# Patient Record
Sex: FEMALE | Race: BLACK OR AFRICAN AMERICAN | NOT HISPANIC OR LATINO
[De-identification: names, ages, dates, MRNs, and addresses within clinical notes are randomized per-mention and may not be internally consistent; named-entity substitution may affect disease eponyms.]

---

## 2017-11-22 ENCOUNTER — APPOINTMENT (OUTPATIENT)
Dept: OBGYN | Facility: CLINIC | Age: 62
End: 2017-11-22
Payer: COMMERCIAL

## 2017-11-22 VITALS
WEIGHT: 180 LBS | BODY MASS INDEX: 29.99 KG/M2 | DIASTOLIC BLOOD PRESSURE: 78 MMHG | HEIGHT: 65 IN | SYSTOLIC BLOOD PRESSURE: 118 MMHG

## 2017-11-22 DIAGNOSIS — R92.2 INCONCLUSIVE MAMMOGRAM: ICD-10-CM

## 2017-11-22 PROCEDURE — 99396 PREV VISIT EST AGE 40-64: CPT

## 2017-11-27 LAB — HPV HIGH+LOW RISK DNA PNL CVX: NOT DETECTED

## 2017-12-02 LAB — CYTOLOGY CVX/VAG DOC THIN PREP: NORMAL

## 2018-11-30 ENCOUNTER — APPOINTMENT (OUTPATIENT)
Dept: OBGYN | Facility: CLINIC | Age: 63
End: 2018-11-30
Payer: COMMERCIAL

## 2018-11-30 VITALS
SYSTOLIC BLOOD PRESSURE: 118 MMHG | HEART RATE: 95 BPM | BODY MASS INDEX: 29.74 KG/M2 | WEIGHT: 178.5 LBS | HEIGHT: 65 IN | DIASTOLIC BLOOD PRESSURE: 75 MMHG

## 2018-11-30 DIAGNOSIS — Z01.419 ENCOUNTER FOR GYNECOLOGICAL EXAMINATION (GENERAL) (ROUTINE) W/OUT ABNORMAL FINDINGS: ICD-10-CM

## 2018-11-30 PROCEDURE — 99396 PREV VISIT EST AGE 40-64: CPT

## 2018-12-03 LAB — HPV HIGH+LOW RISK DNA PNL CVX: NOT DETECTED

## 2018-12-07 LAB — CYTOLOGY CVX/VAG DOC THIN PREP: NORMAL

## 2019-04-07 ENCOUNTER — EMERGENCY (EMERGENCY)
Facility: HOSPITAL | Age: 64
LOS: 1 days | Discharge: HOME | End: 2019-04-07
Attending: EMERGENCY MEDICINE
Payer: COMMERCIAL

## 2019-04-07 VITALS
OXYGEN SATURATION: 97 % | TEMPERATURE: 98 F | HEART RATE: 78 BPM | SYSTOLIC BLOOD PRESSURE: 127 MMHG | DIASTOLIC BLOOD PRESSURE: 73 MMHG | RESPIRATION RATE: 18 BRPM

## 2019-04-07 VITALS
RESPIRATION RATE: 20 BRPM | SYSTOLIC BLOOD PRESSURE: 145 MMHG | TEMPERATURE: 98 F | HEIGHT: 65 IN | HEART RATE: 84 BPM | DIASTOLIC BLOOD PRESSURE: 83 MMHG | WEIGHT: 175.05 LBS | OXYGEN SATURATION: 99 %

## 2019-04-07 LAB
ALBUMIN SERPL ELPH-MCNC: 4.4 G/DL — SIGNIFICANT CHANGE UP (ref 3.3–5)
ALP SERPL-CCNC: 54 U/L — SIGNIFICANT CHANGE UP (ref 40–120)
ALT FLD-CCNC: 13 U/L — SIGNIFICANT CHANGE UP (ref 10–45)
ANION GAP SERPL CALC-SCNC: 12 MMOL/L — SIGNIFICANT CHANGE UP (ref 5–17)
ANISOCYTOSIS BLD QL: SLIGHT — SIGNIFICANT CHANGE UP
APTT BLD: 30.6 SEC — SIGNIFICANT CHANGE UP (ref 27.5–36.3)
AST SERPL-CCNC: 18 U/L — SIGNIFICANT CHANGE UP (ref 10–40)
BASOPHILS # BLD AUTO: 0.1 K/UL — SIGNIFICANT CHANGE UP (ref 0–0.2)
BILIRUB SERPL-MCNC: 0.1 MG/DL — LOW (ref 0.2–1.2)
BUN SERPL-MCNC: 14 MG/DL — SIGNIFICANT CHANGE UP (ref 7–23)
CALCIUM SERPL-MCNC: 9.9 MG/DL — SIGNIFICANT CHANGE UP (ref 8.4–10.5)
CHLORIDE SERPL-SCNC: 102 MMOL/L — SIGNIFICANT CHANGE UP (ref 96–108)
CO2 SERPL-SCNC: 28 MMOL/L — SIGNIFICANT CHANGE UP (ref 22–31)
CREAT SERPL-MCNC: 0.73 MG/DL — SIGNIFICANT CHANGE UP (ref 0.5–1.3)
ELLIPTOCYTES BLD QL SMEAR: SLIGHT — SIGNIFICANT CHANGE UP
EOSINOPHIL # BLD AUTO: 0.2 K/UL — SIGNIFICANT CHANGE UP (ref 0–0.5)
GLUCOSE SERPL-MCNC: 115 MG/DL — HIGH (ref 70–99)
HCT VFR BLD CALC: 38.1 % — SIGNIFICANT CHANGE UP (ref 34.5–45)
HGB BLD-MCNC: 12.2 G/DL — SIGNIFICANT CHANGE UP (ref 11.5–15.5)
HYPOCHROMIA BLD QL: SLIGHT — SIGNIFICANT CHANGE UP
INR BLD: 1.18 RATIO — HIGH (ref 0.88–1.16)
LYMPHOCYTES # BLD AUTO: 35 % — SIGNIFICANT CHANGE UP (ref 13–44)
LYMPHOCYTES # BLD AUTO: 4.8 K/UL — HIGH (ref 1–3.3)
MCHC RBC-ENTMCNC: 23.1 PG — LOW (ref 27–34)
MCHC RBC-ENTMCNC: 31.9 GM/DL — LOW (ref 32–36)
MCV RBC AUTO: 72.4 FL — LOW (ref 80–100)
MICROCYTES BLD QL: SIGNIFICANT CHANGE UP
MONOCYTES # BLD AUTO: 0.8 K/UL — SIGNIFICANT CHANGE UP (ref 0–0.9)
MONOCYTES NFR BLD AUTO: 8 % — SIGNIFICANT CHANGE UP (ref 2–14)
NEUTROPHILS # BLD AUTO: 4 K/UL — SIGNIFICANT CHANGE UP (ref 1.8–7.4)
NEUTROPHILS NFR BLD AUTO: 43 % — SIGNIFICANT CHANGE UP (ref 43–77)
PLAT MORPH BLD: NORMAL — SIGNIFICANT CHANGE UP
PLATELET # BLD AUTO: 208 K/UL — SIGNIFICANT CHANGE UP (ref 150–400)
POIKILOCYTOSIS BLD QL AUTO: SLIGHT — SIGNIFICANT CHANGE UP
POTASSIUM SERPL-MCNC: 3.9 MMOL/L — SIGNIFICANT CHANGE UP (ref 3.5–5.3)
POTASSIUM SERPL-SCNC: 3.9 MMOL/L — SIGNIFICANT CHANGE UP (ref 3.5–5.3)
PROT SERPL-MCNC: 6.9 G/DL — SIGNIFICANT CHANGE UP (ref 6–8.3)
PROTHROM AB SERPL-ACNC: 13.5 SEC — HIGH (ref 10–12.9)
RBC # BLD: 5.27 M/UL — HIGH (ref 3.8–5.2)
RBC # FLD: 12.4 % — SIGNIFICANT CHANGE UP (ref 10.3–14.5)
RBC BLD AUTO: ABNORMAL
SODIUM SERPL-SCNC: 142 MMOL/L — SIGNIFICANT CHANGE UP (ref 135–145)
TROPONIN T, HIGH SENSITIVITY RESULT: <6 NG/L — SIGNIFICANT CHANGE UP (ref 0–51)
VARIANT LYMPHS # BLD: 14 % — HIGH (ref 0–6)
WBC # BLD: 9.9 K/UL — SIGNIFICANT CHANGE UP (ref 3.8–10.5)
WBC # FLD AUTO: 9.9 K/UL — SIGNIFICANT CHANGE UP (ref 3.8–10.5)

## 2019-04-07 PROCEDURE — 70450 CT HEAD/BRAIN W/O DYE: CPT | Mod: 26

## 2019-04-07 PROCEDURE — 80053 COMPREHEN METABOLIC PANEL: CPT

## 2019-04-07 PROCEDURE — 96375 TX/PRO/DX INJ NEW DRUG ADDON: CPT

## 2019-04-07 PROCEDURE — 99285 EMERGENCY DEPT VISIT HI MDM: CPT | Mod: 25

## 2019-04-07 PROCEDURE — 85610 PROTHROMBIN TIME: CPT

## 2019-04-07 PROCEDURE — 96361 HYDRATE IV INFUSION ADD-ON: CPT

## 2019-04-07 PROCEDURE — 93010 ELECTROCARDIOGRAM REPORT: CPT | Mod: NC

## 2019-04-07 PROCEDURE — 71046 X-RAY EXAM CHEST 2 VIEWS: CPT

## 2019-04-07 PROCEDURE — 84484 ASSAY OF TROPONIN QUANT: CPT

## 2019-04-07 PROCEDURE — 99284 EMERGENCY DEPT VISIT MOD MDM: CPT | Mod: 25

## 2019-04-07 PROCEDURE — 85730 THROMBOPLASTIN TIME PARTIAL: CPT

## 2019-04-07 PROCEDURE — 96374 THER/PROPH/DIAG INJ IV PUSH: CPT

## 2019-04-07 PROCEDURE — 93005 ELECTROCARDIOGRAM TRACING: CPT

## 2019-04-07 PROCEDURE — 70450 CT HEAD/BRAIN W/O DYE: CPT

## 2019-04-07 PROCEDURE — 71046 X-RAY EXAM CHEST 2 VIEWS: CPT | Mod: 26

## 2019-04-07 PROCEDURE — 85027 COMPLETE CBC AUTOMATED: CPT

## 2019-04-07 RX ORDER — MECLIZINE HCL 12.5 MG
0 TABLET ORAL
Qty: 0 | Refills: 0 | COMMUNITY

## 2019-04-07 RX ORDER — METFORMIN HYDROCHLORIDE 850 MG/1
0 TABLET ORAL
Qty: 0 | Refills: 0 | COMMUNITY

## 2019-04-07 RX ORDER — METOCLOPRAMIDE HCL 10 MG
10 TABLET ORAL ONCE
Qty: 0 | Refills: 0 | Status: COMPLETED | OUTPATIENT
Start: 2019-04-07 | End: 2019-04-07

## 2019-04-07 RX ORDER — KETOROLAC TROMETHAMINE 30 MG/ML
15 SYRINGE (ML) INJECTION ONCE
Qty: 0 | Refills: 0 | Status: DISCONTINUED | OUTPATIENT
Start: 2019-04-07 | End: 2019-04-07

## 2019-04-07 RX ORDER — ACETAMINOPHEN 500 MG
975 TABLET ORAL ONCE
Qty: 0 | Refills: 0 | Status: COMPLETED | OUTPATIENT
Start: 2019-04-07 | End: 2019-04-07

## 2019-04-07 RX ORDER — RISPERIDONE 4 MG/1
0 TABLET ORAL
Qty: 0 | Refills: 0 | COMMUNITY

## 2019-04-07 RX ORDER — SODIUM CHLORIDE 9 MG/ML
1000 INJECTION INTRAMUSCULAR; INTRAVENOUS; SUBCUTANEOUS ONCE
Qty: 0 | Refills: 0 | Status: COMPLETED | OUTPATIENT
Start: 2019-04-07 | End: 2019-04-07

## 2019-04-07 RX ADMIN — Medication 975 MILLIGRAM(S): at 20:28

## 2019-04-07 RX ADMIN — Medication 15 MILLIGRAM(S): at 22:17

## 2019-04-07 RX ADMIN — Medication 10 MILLIGRAM(S): at 20:28

## 2019-04-07 RX ADMIN — Medication 975 MILLIGRAM(S): at 22:00

## 2019-04-07 RX ADMIN — SODIUM CHLORIDE 1000 MILLILITER(S): 9 INJECTION INTRAMUSCULAR; INTRAVENOUS; SUBCUTANEOUS at 20:28

## 2019-04-07 RX ADMIN — Medication 15 MILLIGRAM(S): at 22:03

## 2019-04-07 RX ADMIN — SODIUM CHLORIDE 1000 MILLILITER(S): 9 INJECTION INTRAMUSCULAR; INTRAVENOUS; SUBCUTANEOUS at 22:00

## 2019-04-07 NOTE — ED ADULT NURSE NOTE - NEURO WDL
Alert and oriented to person, place and time, memory intact, behavior appropriate to situation, PERRL. + dizziness

## 2019-04-07 NOTE — ED PROVIDER NOTE - NSFOLLOWUPCLINICS_GEN_ALL_ED_FT
[facility]  Cardiology  [address] [place] [address]  Phone: [phone]  Fax:     [facility]  Neurology  [address] [address]  Phone: [phone]  Fax:   Follow Up Time:

## 2019-04-07 NOTE — ED PROVIDER NOTE - PROGRESS NOTE DETAILS
Reynaldo PGY2: Pt assessed at beside. Pt resting comfortably, pain controlled, pt questions answered. Vital signs stable. Feels well after meds, asking to be dc'd, informed to fu/ with pmd/cardio for stress and neuro for prior lacunar infarct on cth understands plan will seek fu. Will d/c with PMD f/u, strict return precautions given with read back per pt/family/caregiver.

## 2019-04-07 NOTE — ED PROVIDER NOTE - OBJECTIVE STATEMENT
64F hx of DM prior HA/vertigo however not in long time presents with a  cc of chest palpitations KELLY SOB L sided migraine HA and vertigo, sx for last x48 hours not going away despite meclizine, APAP got concerned for palpitations and SOB prompting ED visit unsure if HA or dizziness c/w prior hasn't had for years no head trauma LOC, no prior cva, no AC. Denies numbness, tingling or loss of sensation. Denies loss of motor function. No prior DVT/PE. Denies n/v/f/c/. Denies syncope, Denies chest palpitations, abdominal pain. Denies dysuria, hematuria, hematochezia, BRBPR, tarry stools, diarrhea, constipation.

## 2019-04-07 NOTE — ED PROVIDER NOTE - PHYSICAL EXAMINATION
GEN APPEARANCE: WDWN, alert and cooperative, non-toxic appearing and in NAD  HEAD: Atraumatic, normocephalic   EYES: PERRLa, EOMI, vision grossly intact.   EARS: Gross hearing intact.   NOSE: No nasal discharge, no external evidence of epistaxis.   NECK: Supple  CV: RRR, S1S2, no c/r/m/g. No cyanosis or pallor. Extremities warm, well perfused. Cap refill <2 seconds. No bruits.   LUNGS: CTAB. No wheezing. No rales. No rhonchi. No diminished breath sounds.   ABDOMEN: Soft, NTND. No guarding or rebound. No masses.   MSK: Spine appears normal, no spine point tenderness. No CVA ttp. No joint erythema or tenderness. Normal muscular development. Pelvis stable.  EXTREMITIES: No peripheral edema. No obvious joint or bony deformity.  NEURO: Alert, follows commands. Weight bearing normal. Speech normal. Sensation and motor normal x4 extremities. CN2-12 normal, coordination normal, ambulating normally.  SKIN: Normal color for race, warm, dry and intact. No evidence of rash.  PSYCH: Normal mood and affect.

## 2019-04-07 NOTE — ED PROVIDER NOTE - CARE PLAN
Assessment and plan of treatment:	Thank you for visiting our Emergency Department, it has been a pleasure taking part in your healthcare.    Your discharge diagnosis is: headache/SOB   Please take all discharge medications as indicated below:  Take Motrin/Tylenol for pain as needed, please follow instructions on manufacturers label. If you have any questions please consult a pharmacist or your PMD.  Please follow up with your PMD within x48 hours.  Please follow up with your neurologist within x48 hours.  Please follow up with Cardiology within x48 hours.  A copy of resulted labs, imaging, and findings have been provided to you.   You have had a detailed discussion with your provider regarding your diagnosis, care management and discharge planning including, but not limited to: return precautions, follow up visits with existing or new providers, new prescriptions and/or medication changes, wound and/or splint/cast care or other care   aspects specific to your diagnosis and treatment. You have been given the opportunity to have your questions answered. At this time you have been deemed stable and fit for discharge.  Return precautions to the Emergency Department include but are not limited to: unrelenting nausea, vomiting, fever, chills, chest pain, shortness of breath, dizziness, chest or abdominal pain, worsening back pain, syncope, blood in urine or stool, headache that doesn't resolve, numbness or tingling, loss of sensation, loss of motor function, or any other concerning symptoms. Principal Discharge DX:	Palpitations  Assessment and plan of treatment:	Thank you for visiting our Emergency Department, it has been a pleasure taking part in your healthcare.    Your discharge diagnosis is: headache/SOB   Please take all discharge medications as indicated below:  Take Motrin/Tylenol for pain as needed, please follow instructions on manufacturers label. If you have any questions please consult a pharmacist or your PMD.  Please follow up with your PMD within x48 hours.  Please follow up with your neurologist within x48 hours.  Please follow up with Cardiology within x48 hours.  A copy of resulted labs, imaging, and findings have been provided to you.   You have had a detailed discussion with your provider regarding your diagnosis, care management and discharge planning including, but not limited to: return precautions, follow up visits with existing or new providers, new prescriptions and/or medication changes, wound and/or splint/cast care or other care   aspects specific to your diagnosis and treatment. You have been given the opportunity to have your questions answered. At this time you have been deemed stable and fit for discharge.  Return precautions to the Emergency Department include but are not limited to: unrelenting nausea, vomiting, fever, chills, chest pain, shortness of breath, dizziness, chest or abdominal pain, worsening back pain, syncope, blood in urine or stool, headache that doesn't resolve, numbness or tingling, loss of sensation, loss of motor function, or any other concerning symptoms.

## 2019-04-07 NOTE — ED PROVIDER NOTE - ATTENDING CONTRIBUTION TO CARE
Patient presenting complaining of two days of intermittent palpitations and fast breathing.  Also reporting having associated migranes/dizziness which she normally has.  PMD is a cardiologist, no known history of CAD.  Now reporting feeling well without palpitations or difficulty breathing.    A 14 point review of systems is negative except as in HPI or otherwise documented.    Exam:  General: Patient well appearing, vital signs within normal limits  HEENT: airway patent with moist mucous membranes  Cardiac: RRR S1/S2 with strong peripheral pulses  Respiratory: lungs clear without respiratory distress  GI: abdomen soft, non tender, non distended  Neuro: no gross neurologic deficits  Skin: warm, well perfused  Psych: normal mood and affect    Patient presenting with palpitations/tachypnea now resolved with unremarkable physical exam, atypical presentation for ACS, no PE risk factors with atypical story for same, no infectious symptoms - plan for screening workup and likely referral to PMD for further workup.

## 2019-04-07 NOTE — ED PROVIDER NOTE - PLAN OF CARE
Thank you for visiting our Emergency Department, it has been a pleasure taking part in your healthcare.    Your discharge diagnosis is: headache/SOB   Please take all discharge medications as indicated below:  Take Motrin/Tylenol for pain as needed, please follow instructions on manufacturers label. If you have any questions please consult a pharmacist or your PMD.  Please follow up with your PMD within x48 hours.  Please follow up with your neurologist within x48 hours.  Please follow up with Cardiology within x48 hours.  A copy of resulted labs, imaging, and findings have been provided to you.   You have had a detailed discussion with your provider regarding your diagnosis, care management and discharge planning including, but not limited to: return precautions, follow up visits with existing or new providers, new prescriptions and/or medication changes, wound and/or splint/cast care or other care   aspects specific to your diagnosis and treatment. You have been given the opportunity to have your questions answered. At this time you have been deemed stable and fit for discharge.  Return precautions to the Emergency Department include but are not limited to: unrelenting nausea, vomiting, fever, chills, chest pain, shortness of breath, dizziness, chest or abdominal pain, worsening back pain, syncope, blood in urine or stool, headache that doesn't resolve, numbness or tingling, loss of sensation, loss of motor function, or any other concerning symptoms.

## 2019-04-07 NOTE — ED ADULT NURSE NOTE - NSIMPLEMENTINTERV_GEN_ALL_ED
Implemented All Fall Risk Interventions:  [place] to call system. Call bell, personal items and telephone within reach. Instruct patient to call for assistance. Room bathroom lighting operational. Non-slip footwear when patient is off stretcher. Physically safe environment: no spills, clutter or unnecessary equipment. Stretcher in lowest position, wheels locked, appropriate side rails in place. Provide visual cue, wrist band, yellow gown, etc. Monitor gait and stability. Monitor for mental status changes and reorient to person, place, and time. Review medications for side effects contributing to fall risk. Reinforce activity limits and safety measures with patient and family.

## 2019-04-07 NOTE — ED PROVIDER NOTE - CLINICAL SUMMARY MEDICAL DECISION MAKING FREE TEXT BOX
Reynaldo PGY2: 64F hx of DM prior HA/vertigo however not in long time presents with a  cc of chest palpitations KELLY SOB L sided migraine HA and vertigo, sx for last x48 hours not going away despite meclizine, APAP got concerned for palpitations and SOB prompting ED visit unsure if HA or dizziness c/w prior hasn't had for years no head trauma LOC, no prior cva, no AC.  exam vss well appearing non toxic exam non focal low c/f acs cva however will get labs cardiacs cth reassess

## 2019-04-07 NOTE — ED ADULT NURSE NOTE - CHPI ED NUR SYMPTOMS NEG
no chest pain/no chills/no vomiting/no syncope/no congestion/no nausea/no diaphoresis/no fever/no back pain

## 2019-04-07 NOTE — ED ADULT NURSE NOTE - OBJECTIVE STATEMENT
pt c/o "dizziness that has not improved with taking meclizine this afternoon . Also some palpitations/sob/ h/a, no visual disturbances- all symptoms started yesterday"

## 2019-11-11 PROBLEM — E11.9 TYPE 2 DIABETES MELLITUS WITHOUT COMPLICATIONS: Chronic | Status: ACTIVE | Noted: 2019-04-07

## 2019-11-11 PROBLEM — R42 DIZZINESS AND GIDDINESS: Chronic | Status: ACTIVE | Noted: 2019-04-07

## 2019-12-12 ENCOUNTER — LABORATORY RESULT (OUTPATIENT)
Age: 64
End: 2019-12-12

## 2019-12-12 ENCOUNTER — APPOINTMENT (OUTPATIENT)
Dept: OBGYN | Facility: CLINIC | Age: 64
End: 2019-12-12
Payer: COMMERCIAL

## 2019-12-12 VITALS
BODY MASS INDEX: 30.82 KG/M2 | WEIGHT: 185 LBS | HEIGHT: 65 IN | DIASTOLIC BLOOD PRESSURE: 76 MMHG | SYSTOLIC BLOOD PRESSURE: 122 MMHG

## 2019-12-12 DIAGNOSIS — Z01.419 ENCOUNTER FOR GYNECOLOGICAL EXAMINATION (GENERAL) (ROUTINE) W/OUT ABNORMAL FINDINGS: ICD-10-CM

## 2019-12-12 PROCEDURE — 99396 PREV VISIT EST AGE 40-64: CPT

## 2019-12-12 NOTE — PHYSICAL EXAM
[Awake] : awake [Acute Distress] : no acute distress [Alert] : alert [Mass] : no breast mass [Nipple Discharge] : no nipple discharge [Soft] : soft [Axillary LAD] : no axillary lymphadenopathy [Tender] : non tender [Oriented x3] : oriented to person, place, and time [Distended] : not distended [Flat Affect] : affect not flat [Normal] : uterus [No Bleeding] : there was no active vaginal bleeding [Uterine Adnexae] : were not tender and not enlarged [Adnexa Tenderness] : were not tender [CTAB] : CTAB [Ovarian Mass (___ Cm)] : there were no adnexal masses [RRR, No Murmurs] : RRR, no murmurs

## 2020-12-21 PROBLEM — Z01.419 ENCOUNTER FOR GYNECOLOGICAL EXAMINATION WITH PAPANICOLAOU SMEAR OF CERVIX: Status: RESOLVED | Noted: 2019-12-12 | Resolved: 2020-12-21

## 2021-02-25 ENCOUNTER — APPOINTMENT (OUTPATIENT)
Dept: OBGYN | Facility: CLINIC | Age: 66
End: 2021-02-25

## 2021-02-26 ENCOUNTER — APPOINTMENT (OUTPATIENT)
Dept: OBGYN | Facility: CLINIC | Age: 66
End: 2021-02-26
Payer: COMMERCIAL

## 2021-02-26 VITALS
HEIGHT: 65 IN | WEIGHT: 165 LBS | SYSTOLIC BLOOD PRESSURE: 130 MMHG | DIASTOLIC BLOOD PRESSURE: 82 MMHG | BODY MASS INDEX: 27.49 KG/M2

## 2021-02-26 VITALS — TEMPERATURE: 97.3 F

## 2021-02-26 DIAGNOSIS — Z01.419 ENCOUNTER FOR GYNECOLOGICAL EXAMINATION (GENERAL) (ROUTINE) W/OUT ABNORMAL FINDINGS: ICD-10-CM

## 2021-02-26 PROCEDURE — 99072 ADDL SUPL MATRL&STAF TM PHE: CPT

## 2021-02-26 PROCEDURE — 99396 PREV VISIT EST AGE 40-64: CPT

## 2021-02-26 NOTE — HISTORY OF PRESENT ILLNESS
[FreeTextEntry1] : 65 /o postmenopausal woman here for annual exam \par no complaints\par Pap 12/12/21 neg HPV neg \par Patient has felt well during COVId,and reluctant to get the vaccine, discussed

## 2021-02-26 NOTE — DISCUSSION/SUMMARY
[FreeTextEntry1] : 65 /o postmenopausal woman here for annual exam\par Pap with cotesting \par Screening Mammogram and Breast U/S for dens breasts\par BDS\par Patient had a colonoscopy in the past, thinks she is due soon\par

## 2021-03-04 LAB
CYTOLOGY CVX/VAG DOC THIN PREP: NORMAL
HPV HIGH+LOW RISK DNA PNL CVX: NOT DETECTED

## 2021-12-16 ENCOUNTER — EMERGENCY (EMERGENCY)
Facility: HOSPITAL | Age: 66
LOS: 1 days | Discharge: HOME | End: 2021-12-16
Attending: EMERGENCY MEDICINE
Payer: MEDICARE

## 2021-12-16 VITALS
TEMPERATURE: 98 F | SYSTOLIC BLOOD PRESSURE: 138 MMHG | RESPIRATION RATE: 20 BRPM | HEART RATE: 97 BPM | OXYGEN SATURATION: 100 % | DIASTOLIC BLOOD PRESSURE: 75 MMHG

## 2021-12-16 VITALS
DIASTOLIC BLOOD PRESSURE: 83 MMHG | HEIGHT: 65 IN | WEIGHT: 164.91 LBS | HEART RATE: 110 BPM | OXYGEN SATURATION: 98 % | RESPIRATION RATE: 16 BRPM | TEMPERATURE: 99 F | SYSTOLIC BLOOD PRESSURE: 144 MMHG

## 2021-12-16 LAB
ALBUMIN SERPL ELPH-MCNC: 3.8 G/DL — SIGNIFICANT CHANGE UP (ref 3.3–5)
ALP SERPL-CCNC: 66 U/L — SIGNIFICANT CHANGE UP (ref 40–120)
ALT FLD-CCNC: 13 U/L — SIGNIFICANT CHANGE UP (ref 10–45)
ANION GAP SERPL CALC-SCNC: 11 MMOL/L — SIGNIFICANT CHANGE UP (ref 5–17)
ANISOCYTOSIS BLD QL: SLIGHT — SIGNIFICANT CHANGE UP
APPEARANCE UR: CLEAR — SIGNIFICANT CHANGE UP
AST SERPL-CCNC: 26 U/L — SIGNIFICANT CHANGE UP (ref 10–40)
BASOPHILS # BLD AUTO: 0 K/UL — SIGNIFICANT CHANGE UP (ref 0–0.2)
BASOPHILS NFR BLD AUTO: 0 % — SIGNIFICANT CHANGE UP (ref 0–2)
BILIRUB SERPL-MCNC: 0.2 MG/DL — SIGNIFICANT CHANGE UP (ref 0.2–1.2)
BILIRUB UR-MCNC: NEGATIVE — SIGNIFICANT CHANGE UP
BUN SERPL-MCNC: 14 MG/DL — SIGNIFICANT CHANGE UP (ref 7–23)
CALCIUM SERPL-MCNC: 9.5 MG/DL — SIGNIFICANT CHANGE UP (ref 8.4–10.5)
CHLORIDE SERPL-SCNC: 102 MMOL/L — SIGNIFICANT CHANGE UP (ref 96–108)
CO2 SERPL-SCNC: 24 MMOL/L — SIGNIFICANT CHANGE UP (ref 22–31)
COLOR SPEC: SIGNIFICANT CHANGE UP
CREAT SERPL-MCNC: 0.66 MG/DL — SIGNIFICANT CHANGE UP (ref 0.5–1.3)
DIFF PNL FLD: NEGATIVE — SIGNIFICANT CHANGE UP
EOSINOPHIL # BLD AUTO: 0 K/UL — SIGNIFICANT CHANGE UP (ref 0–0.5)
EOSINOPHIL NFR BLD AUTO: 0 % — SIGNIFICANT CHANGE UP (ref 0–6)
GLUCOSE SERPL-MCNC: 184 MG/DL — HIGH (ref 70–99)
GLUCOSE UR QL: NEGATIVE — SIGNIFICANT CHANGE UP
HCT VFR BLD CALC: 35.2 % — SIGNIFICANT CHANGE UP (ref 34.5–45)
HGB BLD-MCNC: 10.9 G/DL — LOW (ref 11.5–15.5)
KETONES UR-MCNC: NEGATIVE — SIGNIFICANT CHANGE UP
LEUKOCYTE ESTERASE UR-ACNC: NEGATIVE — SIGNIFICANT CHANGE UP
LYMPHOCYTES # BLD AUTO: 5.05 K/UL — HIGH (ref 1–3.3)
LYMPHOCYTES # BLD AUTO: 65.8 % — HIGH (ref 13–44)
MANUAL SMEAR VERIFICATION: SIGNIFICANT CHANGE UP
MCHC RBC-ENTMCNC: 22.9 PG — LOW (ref 27–34)
MCHC RBC-ENTMCNC: 31 GM/DL — LOW (ref 32–36)
MCV RBC AUTO: 73.8 FL — LOW (ref 80–100)
MICROCYTES BLD QL: SLIGHT — SIGNIFICANT CHANGE UP
MONOCYTES # BLD AUTO: 0.27 K/UL — SIGNIFICANT CHANGE UP (ref 0–0.9)
MONOCYTES NFR BLD AUTO: 3.5 % — SIGNIFICANT CHANGE UP (ref 2–14)
NEUTROPHILS # BLD AUTO: 2.35 K/UL — SIGNIFICANT CHANGE UP (ref 1.8–7.4)
NEUTROPHILS NFR BLD AUTO: 30.7 % — LOW (ref 43–77)
NITRITE UR-MCNC: NEGATIVE — SIGNIFICANT CHANGE UP
PH UR: 6.5 — SIGNIFICANT CHANGE UP (ref 5–8)
PLAT MORPH BLD: NORMAL — SIGNIFICANT CHANGE UP
PLATELET # BLD AUTO: 230 K/UL — SIGNIFICANT CHANGE UP (ref 150–400)
POLYCHROMASIA BLD QL SMEAR: SLIGHT — SIGNIFICANT CHANGE UP
POTASSIUM SERPL-MCNC: 4.5 MMOL/L — SIGNIFICANT CHANGE UP (ref 3.5–5.3)
POTASSIUM SERPL-SCNC: 4.5 MMOL/L — SIGNIFICANT CHANGE UP (ref 3.5–5.3)
PROT SERPL-MCNC: 6.3 G/DL — SIGNIFICANT CHANGE UP (ref 6–8.3)
PROT UR-MCNC: NEGATIVE — SIGNIFICANT CHANGE UP
RBC # BLD: 4.77 M/UL — SIGNIFICANT CHANGE UP (ref 3.8–5.2)
RBC # FLD: 14.6 % — HIGH (ref 10.3–14.5)
RBC BLD AUTO: ABNORMAL
SODIUM SERPL-SCNC: 137 MMOL/L — SIGNIFICANT CHANGE UP (ref 135–145)
SP GR SPEC: 1.01 — SIGNIFICANT CHANGE UP (ref 1.01–1.02)
TARGETS BLD QL SMEAR: SLIGHT — SIGNIFICANT CHANGE UP
UROBILINOGEN FLD QL: NEGATIVE — SIGNIFICANT CHANGE UP
WBC # BLD: 7.67 K/UL — SIGNIFICANT CHANGE UP (ref 3.8–10.5)
WBC # FLD AUTO: 7.67 K/UL — SIGNIFICANT CHANGE UP (ref 3.8–10.5)

## 2021-12-16 PROCEDURE — 97161 PT EVAL LOW COMPLEX 20 MIN: CPT

## 2021-12-16 PROCEDURE — 99284 EMERGENCY DEPT VISIT MOD MDM: CPT

## 2021-12-16 PROCEDURE — 93005 ELECTROCARDIOGRAM TRACING: CPT

## 2021-12-16 PROCEDURE — 85025 COMPLETE CBC W/AUTO DIFF WBC: CPT

## 2021-12-16 PROCEDURE — 80053 COMPREHEN METABOLIC PANEL: CPT

## 2021-12-16 PROCEDURE — 36415 COLL VENOUS BLD VENIPUNCTURE: CPT

## 2021-12-16 PROCEDURE — 81003 URINALYSIS AUTO W/O SCOPE: CPT

## 2021-12-16 RX ORDER — LIDOCAINE 4 G/100G
1 CREAM TOPICAL ONCE
Refills: 0 | Status: COMPLETED | OUTPATIENT
Start: 2021-12-16 | End: 2021-12-16

## 2021-12-16 RX ORDER — DIAZEPAM 5 MG
5 TABLET ORAL ONCE
Refills: 0 | Status: DISCONTINUED | OUTPATIENT
Start: 2021-12-16 | End: 2021-12-16

## 2021-12-16 RX ORDER — SODIUM CHLORIDE 9 MG/ML
1000 INJECTION INTRAMUSCULAR; INTRAVENOUS; SUBCUTANEOUS ONCE
Refills: 0 | Status: COMPLETED | OUTPATIENT
Start: 2021-12-16 | End: 2021-12-16

## 2021-12-16 RX ORDER — OXYCODONE HYDROCHLORIDE 5 MG/1
5 TABLET ORAL ONCE
Refills: 0 | Status: DISCONTINUED | OUTPATIENT
Start: 2021-12-16 | End: 2021-12-16

## 2021-12-16 RX ORDER — ACETAMINOPHEN 500 MG
650 TABLET ORAL ONCE
Refills: 0 | Status: COMPLETED | OUTPATIENT
Start: 2021-12-16 | End: 2021-12-16

## 2021-12-16 RX ADMIN — Medication 5 MILLIGRAM(S): at 12:39

## 2021-12-16 RX ADMIN — LIDOCAINE 1 PATCH: 4 CREAM TOPICAL at 12:39

## 2021-12-16 RX ADMIN — OXYCODONE HYDROCHLORIDE 5 MILLIGRAM(S): 5 TABLET ORAL at 15:39

## 2021-12-16 RX ADMIN — Medication 650 MILLIGRAM(S): at 12:40

## 2021-12-16 RX ADMIN — SODIUM CHLORIDE 1000 MILLILITER(S): 9 INJECTION INTRAMUSCULAR; INTRAVENOUS; SUBCUTANEOUS at 18:52

## 2021-12-16 NOTE — ED ADULT NURSE NOTE - OBJECTIVE STATEMENT
65 yo presents to the ED from home. A&Ox4, ambulatory c/o leg pain radiating from back x months. only hurts in the AM or when ambulating. no trauma. no urinary symptoms. able to ambulate but with pain. Pt states "My pain management doctor sent me to the neurologist and the neurologist told me to come to the ER . I have had x-rays and MRI a few months ago and I was told I have arthritis. My pain is severe in the morning in my back and the front of my legs and it improves as the day goes on, however I have severe pain when I walk and I either need to use my cane or my  to assist me". Pt denies numbness tingling weakness at this time.  Denies loss of bowel or bladder function. Denies fever and chills. Denies chest pains and shortness of breath.

## 2021-12-16 NOTE — CHART NOTE - NSCHARTNOTEFT_GEN_A_CORE
ER covering . PT recs: outpatient PT, DME: RW, Shower chair (3;1 [employer]) and transport W/C   Met with [facility]RIST[facility] De Leon requested scripts for DME and LMN , pt was provided with a RW by ED. Patient made aware that CM will order DME if the plan is for DC today however DME will not be approved and delivered tonight as they are closed. As per ACP unclear at this time if pt will be DC due to elevated HR. CM contact number provided to CHRISTIAN De Leon in case pt is DC tonight and made aware that CM will need this by 7:30.

## 2021-12-16 NOTE — ED PROVIDER NOTE - ATTENDING CONTRIBUTION TO CARE
65 y/o f with pmhx presents for b/l leg pain over the lat few months, worsening in the last week. pain worse with ambulation and is using her  to assist her with ambulation. no incontinence of bowel or bladder. no saddle anesthesia. no urinary retentio. no weakness or numbness. had outpt XR and MRI which states were normal  Gen.  at rest appears comfortable, but noted discomfort when ambulating and holding on to her   HEENT:  perrl eomi  Lungs:  b/l bs  CVS: S1S2   Abd;  soft non tend no distention  Ext: no pitting edema no erythema  Neuro: aaox3  MSK: strength 5/5 b/l upper and lower ext.

## 2021-12-16 NOTE — PHYSICAL THERAPY INITIAL EVALUATION ADULT - PERTINENT HX OF CURRENT PROBLEM, REHAB EVAL
presents to the ER for evaluation of bilateral leg pain for the past several months, worsening in the last week. Pt under the care of pain mgmt. Pt awake, alert oriented x3 states "My pain management doctor sent me to the neurologist and the neurologist told me to come to the ER .  I have had x-rays and MRI a few months ago and I was told I have arthritis.

## 2021-12-16 NOTE — ED PROVIDER NOTE - PATIENT PORTAL LINK FT
You can access the FollowMyHealth Patient Portal offered by [facility] by registering at the following website: [email or web address]. By joining [facility] FollowMyHealth portal, you will also be able to view your health information using other applications (apps) compatible with our system.

## 2021-12-16 NOTE — ED PROVIDER NOTE - NSFOLLOWUPINSTRUCTIONS_ED_ALL_ED_FT
1. Follow up with your primary care physician within 2-3days for reevaluation.  2.  Return to the Emergency Department for worsening, progressive or any other concerning symptoms.   3. -- Please use 650mg Tylenol (also called acetaminophen) every 4 hours & 600mg Motrin (also called Advil or ibuprofen) every 6 hours as needed for pain/discomfort/swelling. You can get these without a prescription. Don't use more than 3500mg of Tylenol in any 24-hour period. Make sure your other prescription/over-the-counter medications don't contain any Tylenol so you don't take too much. If you have any stomach discomfort while taking Motrin, you can use TUMS or Pepcid or Zantac (these can all be bought without a prescription).   4. Follow up with your neurologist  and pain management   in the next week and orthopedist   5.  Use lidocaine patch to affected area for pain once daily

## 2021-12-16 NOTE — ED ADULT NURSE REASSESSMENT NOTE - NS ED NURSE REASSESS COMMENT FT1
Report received from FELICIA Golden. Pt AAOx4, NAD, resp nonlabored, skin warm/dry, resting comfortably in bed with family at bedside. Pt with no complaints at this time, assisted to bathroom with walker, steady gait noted. Pt denies headache, dizziness, chest pain, palpitations, SOB, abd pain, n/v/d, urinary symptoms, fevers, chills, weakness at this time. Pt awaiting dispo. Safety maintained.

## 2021-12-16 NOTE — ED PROVIDER NOTE - CLINICAL SUMMARY MEDICAL DECISION MAKING FREE TEXT BOX
pt with chronic bilat  leg pain with hx of lwoer back pain, no recent injury or truama,  had recent imaging, analgesia,   tachym, labs, ekg, ya,    o k to dc    f/y with ortho, neuor, pain pt with chronic bilat  leg pain with hx of lwoer back pain, no recent injury or truama,  had recent imaging, analgesia,   tachym, labs, ekg, ya,    o k to dc    f/y with ortho, neuor, pain  ATTG: : back pain with outpt imaging , will treat with pain medication, check labs, Physical therapy eval, and re eval for dispo.

## 2021-12-16 NOTE — PHYSICAL THERAPY INITIAL EVALUATION ADULT - ADDITIONAL COMMENTS
Pt. lives in apt. with , 3 steps to get in. ~10 steps inside. Patient ambulated without AD independent. Lately holding on to  while walking.  available to assist at all times. No h/o falls.

## 2021-12-16 NOTE — ED PROVIDER NOTE - CARE PROVIDER_API CALL
Ruddy Cordoba)  Orthopedics  [address] [place] [address]  Phone: [phone]  Fax: [phone]  Follow Up Time: 7-10 Days

## 2021-12-16 NOTE — ED PROVIDER NOTE - OBJECTIVE STATEMENT
67 yo female in 31 L presents to the ER for evaluation of bilateral leg pain for the past several months, worsening in the last week. Pt under the care of pain mgmt. 65 yo female in 31 L presents to the ER for evaluation of bilateral leg pain for the past several months, worsening in the last week. Pt under the care of pain mgmt. Pt awake, alert oriented x3 states "My pain management doctor sent me to the neurologist and the neurologist told me to come to the ER .  I have had x-rays and MRI a few months ago and I was told I have arthritis.  My pain is severe in the morning in my back and the front of my legs and it improves as the day goes on, however I have severe pain when I walk and I either need to use my cane or my  to assist me". Pt denies numbness tingling weakness at this time.  DEnies loss of bowel or bladder function. Denies fever and chills.  Denies chest pains and shortness of breath.

## 2021-12-16 NOTE — PHYSICAL THERAPY INITIAL EVALUATION ADULT - LEVEL OF INDEPENDENCE: STAIR NEGOTIATION, REHAB EVAL
To be assessed as appropriate pt. in ER and steps not available. able to perform high stepping holding to walker with supervision.

## 2021-12-16 NOTE — ED PROVIDER NOTE - PROGRESS NOTE DETAILS
Alhaji Amaya NP-C Called pt's neurologist Carlos Deng 3x,  was placed on hold and never connected with staff.   Pt resting comfortably at this time endorsed to me by rosalba. re-examined. 2+ dp pulse, sensory intact bl le. no leg swelling. 5/5 ehl. no warmth. no obv cause of the tachy, improved / improving. pain quite uncomfortable mostly to bear weight and to walk. dw her need to f/u orthopedics. Alhaji Amaya NP-C - Pt's HR 98,  Pt feeling  better at this time.  UA neg,   labs normal, EKG sinus tach, hr dec 98.   Pt to dc home  f/u with  neuro and f/u pain mgmt.

## 2021-12-16 NOTE — PHYSICAL THERAPY INITIAL EVALUATION ADULT - PLANNED THERAPY INTERVENTIONS, PT EVAL
[facility]        Pt Name: Eli Beltran  MRN: [id number]  [place] 2010  Date of evaluation: 5/18/2019  Provider: Mervin Green PA-C  PCP: No primary care provider on file. This patient was not seen and evaluated by the attending physician No att. providers found. CHIEF COMPLAINT       Chief Complaint   Patient presents with    Cough     cough and congestion onset last night. denies fever       HISTORY OF PRESENT ILLNESS   (Location/Symptom, Timing/Onset, Context/Setting, Quality, Duration, Modifying Factors, Severity)  Note limiting factors. Eli Beltran is a 10 y.o. male . Presents for evaluation of cough and congestion that began yesterday. Patient has also been complaining of chest pain with cough. No sore throat, difficulty swallowing or drooling. No rashes. No abdominal pain, vomiting or diarrhea. He is eating and drinking with good urine output. No known sick contacts with similar symptoms. Otherwise healthy with no other complaints or concerns at this time. Immunizations are up-to-date. Nursing Notes were all reviewed and agreed with or any disagreements were addressed  in the HPI. REVIEW OF SYSTEMS    (2-9 systems for level 4, 10 or more for level 5)     Review of Systems   Constitutional: Negative for activity change, appetite change, chills and fever. HENT: Positive for congestion, rhinorrhea and sneezing. Negative for trouble swallowing. Respiratory: Positive for cough. Negative for shortness of breath. Cardiovascular: Positive for chest pain. Gastrointestinal: Negative for abdominal pain, constipation, diarrhea and vomiting. Genitourinary: Negative for difficulty urinating, dysuria, enuresis, frequency, hematuria and urgency. Musculoskeletal: Negative for gait problem, neck pain and neck stiffness. Skin: Negative for rash. Neurological: Negative for headaches.        Positives and Pertinent negatives as per HPI. Except as noted abovein the ROS, all other systems were reviewed and negative. PAST MEDICAL HISTORY   History reviewed. No pertinent past medical history. SURGICAL HISTORY   History reviewed. No pertinent surgical history. CURRENTMEDICATIONS       Previous Medications    No medications on file         ALLERGIES     Patient has no known allergies. FAMILYHISTORY     History reviewed. No pertinent family history.        SOCIAL HISTORY       Social History     Socioeconomic History    Marital status: Single     Spouse name: None    Number of children: None    Years of education: None    Highest education level: None   Occupational History    None   Social Needs    Financial resource strain: None    Food insecurity:     Worry: None     Inability: None    Transportation needs:     Medical: None     Non-medical: None   Tobacco Use    Smoking status: Never Smoker    Smokeless tobacco: Never Used   Substance and Sexual Activity    Alcohol use: None    Drug use: None    Sexual activity: None   Lifestyle    Physical activity:     Days per week: None     Minutes per session: None    Stress: None   Relationships    Social connections:     Talks on phone: None     Gets together: None     Attends Bahai service: None     Active member of club or organization: None     Attends meetings of clubs or organizations: None     Relationship status: None    Intimate partner violence:     Fear of current or ex partner: None     Emotionally abused: None     Physically abused: None     Forced sexual activity: None   Other Topics Concern    None   Social History Narrative    None       SCREENINGS             PHYSICAL EXAM    (up to 7 for level 4, 8 or more for level 5)     ED Triage Vitals [05/18/19 1745]   BP Temp Temp Source Heart Rate Resp SpO2 Height Weight - Scale   -- 97.9 °F (36.6 °C) Infrared 111 18 100 % -- 63 lb (28.6 kg)       Physical Exam Constitutional: He appears well-developed and well-nourished. He is active. HENT:   Head: Atraumatic. Right Ear: Tympanic membrane normal.   Left Ear: Tympanic membrane normal.   Nose: Nasal discharge present. Mouth/Throat: Mucous membranes are moist. No tonsillar exudate. Pharynx is normal.   Eyes: Right eye exhibits no discharge. Left eye exhibits no discharge. Neck: Normal range of motion. Neck supple. Cardiovascular: Normal rate and regular rhythm. Pulmonary/Chest: Effort normal. No stridor. No respiratory distress. Air movement is not decreased. He has no wheezes. He has rhonchi. He exhibits no retraction. Abdominal: Full and soft. Bowel sounds are normal.   Musculoskeletal: Normal range of motion. He exhibits no deformity. Neurological: He is alert. Skin: Skin is warm and dry. He is not diaphoretic. Nursing note and vitals reviewed. DIAGNOSTIC RESULTS   LABS:    Labs Reviewed - No data to display    All other labs were within normal range or not returned as of this dictation. EKG: All EKG's are interpreted by the Emergency Department Physician who either signs orCo-signs this chart in the absence of a cardiologist.  Please see their note for interpretation of EKG. RADIOLOGY:   Non-plain film images such as CT, Ultrasound and MRI are read by the radiologist. Plain radiographic images are visualized andpreliminarily interpreted by the  ED Provider with the below findings:        Interpretation perthe Radiologist below, if available at the time of this note:    XR CHEST STANDARD (2 VW)   Final Result   No active cardiopulmonary disease           Xr Chest Standard (2 Vw)    Result Date: 5/18/2019  EXAMINATION: TWO XRAY VIEWS OF THE CHEST 5/18/2019 6:09 pm COMPARISON: None. HISTORY: ORDERING SYSTEM PROVIDED HISTORY: fever, cough TECHNOLOGIST PROVIDED HISTORY: Reason for exam:->fever, cough FINDINGS: Heart size and pulmonary vessels within normal limits. Lungs clear.  Costophrenic angles sharp     No active cardiopulmonary disease          PROCEDURES   Unless otherwise noted below, none     Procedures    CRITICAL CARE TIME   N/A    CONSULTS:  None      EMERGENCY DEPARTMENT COURSE and DIFFERENTIALDIAGNOSIS/MDM:   Vitals:    Vitals:    05/18/19 1745   Pulse: 111   Resp: 18   Temp: 97.9 °F (36.6 °C)   TempSrc: Infrared   SpO2: 100%   Weight: 63 lb (28.6 kg)       Patient was given thefollowing medications:  Medications - No data to display    Patient presents for evaluation of cough congestion, genital exam, he is well-appearing and in no acute distress. Vitals are stable and he is afebrile. He does have rhonchi noted to right lung base, cleared with cough. Good aeration with no wheezing. Chest is nontender and abdomen is benign. He has dried nasal drainage. HEENT exam is otherwise unremarkable. Chest x-ray shows no active cardio pulmonary disease. Patient was given prescriptions for Motrin and Claritin for symptomatic relief. I estimate there is LOW risk for PNEUMONIA, MENINGITIS, PERITONSILLAR ABSCESS, SEPSIS, MALIGNANT OTITIS EXTERNA, OR EPIGLOTTITIS thus I consider the discharge disposition reasonable. Based on clinical presentation, physical exam and diagnostics, the patient likely has a viral illness. I discussed symptomatic treatment, fluids, and rest. Patient is advised to follow-up with their family doctor within 24-48 hours and return to the ER if he does not improve as anticipated over the next several days, develops difficulty breathing, weakness, inability to take liquids, or has other concerns. FINAL IMPRESSION      1.  Viral URI with cough          DISPOSITION/PLAN   DISPOSITION Decision To Discharge 05/18/2019 06:44:18 PM      PATIENT REFERREDTO:  [facility] Emergency Department  [address]  [phone]  Go to   If symptoms worsen    [facility]) Pre-Services  [phone]          DISCHARGE MEDICATIONS:  New Prescriptions DESLORATADINE (CLARINEX) 0.5 MG/ML SYRUP    Take 5 mLs by mouth daily    IBUPROFEN (CHILDRENS ADVIL) 100 MG/5ML SUSPENSION    Take 14.3 mLs by mouth every 8 hours as needed for Fever       DISCONTINUED MEDICATIONS:  Discontinued Medications    No medications on file              (Please note that portions ofthis note were completed with a voice recognition program.  Efforts were made to edit the dictations but occasionally words are mis-transcribed.)    Wilfred Uribe PA-C (electronically signed)            [place], [place]  05/18/19 3817 GOAL: Pt will negotiate 5 steps  up and down using HR support, independent  within 2-3 weeks./balance training/gait training/strengthening

## 2021-12-23 ENCOUNTER — APPOINTMENT (OUTPATIENT)
Dept: ORTHOPEDIC SURGERY | Facility: CLINIC | Age: 66
End: 2021-12-23
Payer: MEDICARE

## 2021-12-23 VITALS
BODY MASS INDEX: 27.49 KG/M2 | SYSTOLIC BLOOD PRESSURE: 132 MMHG | HEIGHT: 65 IN | DIASTOLIC BLOOD PRESSURE: 81 MMHG | HEART RATE: 96 BPM | WEIGHT: 165 LBS

## 2021-12-23 PROCEDURE — 99204 OFFICE O/P NEW MOD 45 MIN: CPT

## 2022-01-03 ENCOUNTER — EMERGENCY (EMERGENCY)
Facility: HOSPITAL | Age: 67
LOS: 1 days | Discharge: HOME | End: 2022-01-03
Attending: EMERGENCY MEDICINE
Payer: MEDICARE

## 2022-01-03 VITALS
TEMPERATURE: 98 F | RESPIRATION RATE: 18 BRPM | DIASTOLIC BLOOD PRESSURE: 85 MMHG | SYSTOLIC BLOOD PRESSURE: 127 MMHG | OXYGEN SATURATION: 97 % | HEART RATE: 123 BPM

## 2022-01-03 VITALS
TEMPERATURE: 98 F | WEIGHT: 164.91 LBS | RESPIRATION RATE: 16 BRPM | HEIGHT: 65 IN | DIASTOLIC BLOOD PRESSURE: 66 MMHG | HEART RATE: 97 BPM | OXYGEN SATURATION: 100 % | SYSTOLIC BLOOD PRESSURE: 146 MMHG

## 2022-01-03 LAB
ALBUMIN SERPL ELPH-MCNC: 4.2 G/DL — SIGNIFICANT CHANGE UP (ref 3.3–5)
ALP SERPL-CCNC: 54 U/L — SIGNIFICANT CHANGE UP (ref 40–120)
ALT FLD-CCNC: 11 U/L — SIGNIFICANT CHANGE UP (ref 10–45)
ANION GAP SERPL CALC-SCNC: 13 MMOL/L — SIGNIFICANT CHANGE UP (ref 5–17)
APPEARANCE UR: CLEAR — SIGNIFICANT CHANGE UP
AST SERPL-CCNC: 42 U/L — HIGH (ref 10–40)
BASOPHILS # BLD AUTO: 0.08 K/UL — SIGNIFICANT CHANGE UP (ref 0–0.2)
BASOPHILS NFR BLD AUTO: 0.9 % — SIGNIFICANT CHANGE UP (ref 0–2)
BILIRUB SERPL-MCNC: 0.4 MG/DL — SIGNIFICANT CHANGE UP (ref 0.2–1.2)
BILIRUB UR-MCNC: NEGATIVE — SIGNIFICANT CHANGE UP
BUN SERPL-MCNC: 9 MG/DL — SIGNIFICANT CHANGE UP (ref 7–23)
CALCIUM SERPL-MCNC: 9.6 MG/DL — SIGNIFICANT CHANGE UP (ref 8.4–10.5)
CHLORIDE SERPL-SCNC: 98 MMOL/L — SIGNIFICANT CHANGE UP (ref 96–108)
CO2 SERPL-SCNC: 23 MMOL/L — SIGNIFICANT CHANGE UP (ref 22–31)
COLOR SPEC: SIGNIFICANT CHANGE UP
CREAT SERPL-MCNC: 0.56 MG/DL — SIGNIFICANT CHANGE UP (ref 0.5–1.3)
CRP SERPL-MCNC: <3 MG/L — SIGNIFICANT CHANGE UP (ref 0–4)
DIFF PNL FLD: NEGATIVE — SIGNIFICANT CHANGE UP
ELLIPTOCYTES BLD QL SMEAR: SLIGHT — SIGNIFICANT CHANGE UP
EOSINOPHIL # BLD AUTO: 0 K/UL — SIGNIFICANT CHANGE UP (ref 0–0.5)
EOSINOPHIL NFR BLD AUTO: 0 % — SIGNIFICANT CHANGE UP (ref 0–6)
ERYTHROCYTE [SEDIMENTATION RATE] IN BLOOD: 45 MM/HR — HIGH (ref 0–20)
GIANT PLATELETS BLD QL SMEAR: PRESENT — SIGNIFICANT CHANGE UP
GLUCOSE SERPL-MCNC: 159 MG/DL — HIGH (ref 70–99)
GLUCOSE UR QL: NEGATIVE — SIGNIFICANT CHANGE UP
HCT VFR BLD CALC: 38.9 % — SIGNIFICANT CHANGE UP (ref 34.5–45)
HGB BLD-MCNC: 12 G/DL — SIGNIFICANT CHANGE UP (ref 11.5–15.5)
HYPOCHROMIA BLD QL: SLIGHT — SIGNIFICANT CHANGE UP
KETONES UR-MCNC: NEGATIVE — SIGNIFICANT CHANGE UP
LEUKOCYTE ESTERASE UR-ACNC: NEGATIVE — SIGNIFICANT CHANGE UP
LYMPHOCYTES # BLD AUTO: 2.78 K/UL — SIGNIFICANT CHANGE UP (ref 1–3.3)
LYMPHOCYTES # BLD AUTO: 31.6 % — SIGNIFICANT CHANGE UP (ref 13–44)
MANUAL SMEAR VERIFICATION: SIGNIFICANT CHANGE UP
MCHC RBC-ENTMCNC: 22.6 PG — LOW (ref 27–34)
MCHC RBC-ENTMCNC: 30.8 GM/DL — LOW (ref 32–36)
MCV RBC AUTO: 73.4 FL — LOW (ref 80–100)
MONOCYTES # BLD AUTO: 0.46 K/UL — SIGNIFICANT CHANGE UP (ref 0–0.9)
MONOCYTES NFR BLD AUTO: 5.2 % — SIGNIFICANT CHANGE UP (ref 2–14)
NEUTROPHILS # BLD AUTO: 5.48 K/UL — SIGNIFICANT CHANGE UP (ref 1.8–7.4)
NEUTROPHILS NFR BLD AUTO: 62.3 % — SIGNIFICANT CHANGE UP (ref 43–77)
NITRITE UR-MCNC: NEGATIVE — SIGNIFICANT CHANGE UP
PH UR: 6.5 — SIGNIFICANT CHANGE UP (ref 5–8)
PLAT MORPH BLD: NORMAL — SIGNIFICANT CHANGE UP
PLATELET # BLD AUTO: 303 K/UL — SIGNIFICANT CHANGE UP (ref 150–400)
POIKILOCYTOSIS BLD QL AUTO: SLIGHT — SIGNIFICANT CHANGE UP
POLYCHROMASIA BLD QL SMEAR: SLIGHT — SIGNIFICANT CHANGE UP
POTASSIUM SERPL-MCNC: 5.7 MMOL/L — HIGH (ref 3.5–5.3)
POTASSIUM SERPL-SCNC: 5.7 MMOL/L — HIGH (ref 3.5–5.3)
PROT SERPL-MCNC: 7 G/DL — SIGNIFICANT CHANGE UP (ref 6–8.3)
PROT UR-MCNC: SIGNIFICANT CHANGE UP
RBC # BLD: 5.3 M/UL — HIGH (ref 3.8–5.2)
RBC # FLD: 14 % — SIGNIFICANT CHANGE UP (ref 10.3–14.5)
RBC BLD AUTO: ABNORMAL
SARS-COV-2 RNA SPEC QL NAA+PROBE: SIGNIFICANT CHANGE UP
SODIUM SERPL-SCNC: 134 MMOL/L — LOW (ref 135–145)
SP GR SPEC: 1.01 — SIGNIFICANT CHANGE UP (ref 1.01–1.02)
T PALLIDUM AB TITR SER: NEGATIVE — SIGNIFICANT CHANGE UP
TARGETS BLD QL SMEAR: SLIGHT — SIGNIFICANT CHANGE UP
UROBILINOGEN FLD QL: NEGATIVE — SIGNIFICANT CHANGE UP
WBC # BLD: 8.8 K/UL — SIGNIFICANT CHANGE UP (ref 3.8–10.5)
WBC # FLD AUTO: 8.8 K/UL — SIGNIFICANT CHANGE UP (ref 3.8–10.5)

## 2022-01-03 PROCEDURE — 81003 URINALYSIS AUTO W/O SCOPE: CPT

## 2022-01-03 PROCEDURE — 99285 EMERGENCY DEPT VISIT HI MDM: CPT

## 2022-01-03 PROCEDURE — 85025 COMPLETE CBC W/AUTO DIFF WBC: CPT

## 2022-01-03 PROCEDURE — 72100 X-RAY EXAM L-S SPINE 2/3 VWS: CPT

## 2022-01-03 PROCEDURE — 80053 COMPREHEN METABOLIC PANEL: CPT

## 2022-01-03 PROCEDURE — 72131 CT LUMBAR SPINE W/O DYE: CPT | Mod: MA

## 2022-01-03 PROCEDURE — 72100 X-RAY EXAM L-S SPINE 2/3 VWS: CPT | Mod: 26

## 2022-01-03 PROCEDURE — 99284 EMERGENCY DEPT VISIT MOD MDM: CPT | Mod: 25

## 2022-01-03 PROCEDURE — 97161 PT EVAL LOW COMPLEX 20 MIN: CPT

## 2022-01-03 PROCEDURE — 86140 C-REACTIVE PROTEIN: CPT

## 2022-01-03 PROCEDURE — 96374 THER/PROPH/DIAG INJ IV PUSH: CPT

## 2022-01-03 PROCEDURE — 85652 RBC SED RATE AUTOMATED: CPT

## 2022-01-03 PROCEDURE — 99053 MED SERV 10PM-8AM 24 HR FAC: CPT

## 2022-01-03 PROCEDURE — U0005: CPT

## 2022-01-03 PROCEDURE — 87086 URINE CULTURE/COLONY COUNT: CPT

## 2022-01-03 PROCEDURE — 72131 CT LUMBAR SPINE W/O DYE: CPT | Mod: 26,MA

## 2022-01-03 PROCEDURE — 82607 VITAMIN B-12: CPT

## 2022-01-03 PROCEDURE — 86780 TREPONEMA PALLIDUM: CPT

## 2022-01-03 PROCEDURE — U0003: CPT

## 2022-01-03 RX ORDER — KETOROLAC TROMETHAMINE 30 MG/ML
15 SYRINGE (ML) INJECTION ONCE
Refills: 0 | Status: DISCONTINUED | OUTPATIENT
Start: 2022-01-03 | End: 2022-01-03

## 2022-01-03 RX ORDER — IBUPROFEN 200 MG
400 TABLET ORAL ONCE
Refills: 0 | Status: DISCONTINUED | OUTPATIENT
Start: 2022-01-03 | End: 2022-01-03

## 2022-01-03 RX ORDER — ACETAMINOPHEN 500 MG
975 TABLET ORAL ONCE
Refills: 0 | Status: COMPLETED | OUTPATIENT
Start: 2022-01-03 | End: 2022-01-03

## 2022-01-03 RX ORDER — ACETAMINOPHEN 500 MG
650 TABLET ORAL ONCE
Refills: 0 | Status: DISCONTINUED | OUTPATIENT
Start: 2022-01-03 | End: 2022-01-03

## 2022-01-03 RX ORDER — LIDOCAINE 4 G/100G
1 CREAM TOPICAL ONCE
Refills: 0 | Status: COMPLETED | OUTPATIENT
Start: 2022-01-03 | End: 2022-01-03

## 2022-01-03 RX ADMIN — Medication 15 MILLIGRAM(S): at 10:46

## 2022-01-03 RX ADMIN — LIDOCAINE 1 PATCH: 4 CREAM TOPICAL at 08:48

## 2022-01-03 RX ADMIN — Medication 400 MILLIGRAM(S): at 09:38

## 2022-01-03 RX ADMIN — Medication 650 MILLIGRAM(S): at 09:38

## 2022-01-03 RX ADMIN — Medication 650 MILLIGRAM(S): at 08:48

## 2022-01-03 RX ADMIN — Medication 400 MILLIGRAM(S): at 08:48

## 2022-01-03 RX ADMIN — Medication 50 MILLIGRAM(S): at 10:45

## 2022-01-03 RX ADMIN — Medication 15 MILLIGRAM(S): at 10:20

## 2022-01-03 NOTE — PHYSICAL THERAPY INITIAL EVALUATION ADULT - DISCHARGE DISPOSITION, PT EVAL
PT recommends using rolling walker for all functional activities-pt aware and verbalized agreement./home/home w/ assist/home w/ home PT

## 2022-01-03 NOTE — ED ADULT NURSE REASSESSMENT NOTE - NS ED NURSE REASSESS COMMENT FT1
RN tried contacted [facility]3( left message) w/ pt, no answered. Will continue calling, pt walked out hospital w/ IV on.

## 2022-01-03 NOTE — ED PROVIDER NOTE - OBJECTIVE STATEMENT
65yo female controlled NIDDM2 c chronic back pain now requiring walker x3-4w due to worsening gait. worse with movement improved with rest. sharp tingling 8/10 pain. incontinence mixed functional v neurogenic (no urge until too late without saddle anesthesia and difficulty getting to toilet in time.) +n/t B feet +weakness +night sweeats +intentional weight loss 10lbs 1.5m. no IVDU no new sexual partners. no fever chills. no n/v/d cp sob dysuria. no trauma falls. dx of spinal stenosis by ortho. sent in by jeronimo thurston tbelma for MRI. 67yo female controlled NIDDM2 c chronic back pain now requiring walker x3-4w due to worsening gait. worse with movement improved with rest. sharp tingling 8/10 pain. +n/t B feet +weakness +night sweeats +intentional weight loss 10lbs 1.5m. no IVDU no new sexual partners. no fever chills. no n/v/d cp sob dysuria. no trauma falls. dx of spinal stenosis by ortho. sent in by jeronimo diaz for MRI per carey.

## 2022-01-03 NOTE — PHYSICAL THERAPY INITIAL EVALUATION ADULT - PLANNED THERAPY INTERVENTIONS, PT EVAL
Stair training: GOAL: Pt will negotiate 10 steps using 1 handrail with supervision in 2 weeks./gait training/transfer training

## 2022-01-03 NOTE — ED PROVIDER NOTE - PATIENT PORTAL LINK FT
You can access the FollowMyHealth Patient Portal offered by [facility] by registering at the following website: [email or web address]. By joining [employer]’s FollowMyHealth portal, you will also be able to view your health information using other applications (apps) compatible with our system.

## 2022-01-03 NOTE — PHYSICAL THERAPY INITIAL EVALUATION ADULT - LIVES WITH, PROFILE
Pt and  live together in a house with a few steps to enter with +railing and 1 flight inside home with +railing./spouse

## 2022-01-03 NOTE — CONSULT NOTE ADULT - ASSESSMENT
This is a 66y year old Female with the below past medical history who presents with the chief complaint of low back pain and gait anl.  She sees my associate Dr. Proctor as she has increasing low back and leg pain in late 2021.  She describes pain in the back that radiates into the legs, sharp in quality.  She was seen by Dr brunner pain management, was tried on cyclobenzaprine, neurontin without relief, stopped all but tramadol.  She was seen by Dr Saha who as per report reviewed her mrs from sept and told her she had spinal stenosis.  However increasing pain in low back, worse when tries to stand and walk.  Better if sitting or lying down.  No other clear exacerbating factors.  She feels the legs are weaker and now needs a rolling walker.  She told my associate when seen on 12/30 no numbness, she says unclear if some at the level of her rectum.  She describes urinary incontinence but appears to be that she cannot get to the bathroom in time.  No bowel issues beyond longstanding constipation.  She feels tramadol may help but makes her confused.  No new issues with speech, swallow, vision, or sx involving the arms. When seen in the office on 12/30 she said she did not want an outpt workup, referred to ER as pt walking has become more difficult.   exam notable for proximal leg weakness in setting of pain    Appears to have spinal stenosis and has progression of sx despite pain management/NABEEL and medication management.   Due to inability to ambulate well, she was sent to the ER    Admit (she is a pt of [facility])  [facility] to repeat mri lumbar  consult ortho reji to see  consider pain management consult  can start lyrica 50 tid, she says not tried this in the past  stool softner  PT, if no intervention, she would be amenable for rehab    d/w pt and KAROLINE Tirado MD

## 2022-01-03 NOTE — PHYSICAL THERAPY INITIAL EVALUATION ADULT - ADDITIONAL COMMENTS
PTA pt was ambulating without a device until 3 weeks ago when the LBP started. Pt states her  assists her with ADLs (getting out of bed, getting dressed, taking showers). Pt has a walk in shower with no grab bars and no shower chair, pt does own rolling walker.

## 2022-01-03 NOTE — ED PROVIDER NOTE - PHYSICAL EXAMINATION
General: non-toxic, NAD  HEENT: NCAT, PERRL  Cardiac: RRR, no murmurs, 2+ peripheral pulses  Resp: CTAB  Abdomen: soft, non-distended, bowel sounds present, no ttp, no rebound or guarding. no organomegaly  MSK: no midline spinal tenderness no peripheral edema, calf tenderness, or leg size discrepancies  Skin: no rashes  Neuro: AAOx4, 5+motor, hyperesthesia L4 distribution. gait extremely slow and shuffling, with intermittent pauses.    Psych: mood and affect appropriate

## 2022-01-03 NOTE — CHART NOTE - NSCHARTNOTEFT_GEN_A_CORE
Measure fit and deliver [place] custom fit LSO. Donned for fit orthosis fit well. Reviewed application skin precautions and care. Written instructions and contact information given. To notify office with any issues questions or concerns.    Orthosis to be worn when out of bed to reduce pain by reducing mobility of the trunk and allow daily activity.    Rasta RIVERA  [facility]  [phone]

## 2022-01-03 NOTE — ED ADULT NURSE NOTE - OBJECTIVE STATEMENT
66 y female presents from home with lower back pain x 2 weeks worse today. reports to unrelieved Tramadol. radiates down both legs. reports to numbness/ tingling down both legs. denies cp, sob, n/v/d, lightheadedness, dizziness, fevers, chills. followed with spine specialist; no MRI scheduled. a&ox3. 5/5 strength and sensation bilaterally. breathing comfortably in bed- no distress. abdomen soft nondistended. safety maintained- bed locked in lowest position. vss. awaiting md.

## 2022-01-03 NOTE — ED ADULT TRIAGE NOTE - CHIEF COMPLAINT QUOTE
lower back pain since September, worsening today. Patient states she "normally takes tramadol for pain, but did not take it today because she had fallen asleep earlier than usual and had forgotten to take it." Radiating pain and tingling present in the legs.

## 2022-01-03 NOTE — ED PROVIDER NOTE - PROGRESS NOTE DETAILS
brigid pgy1 I spoke to  Dr. Neo Nevarez, the partner or pt's neurologist, who stated that the pt was not sent directly for admission for MRI, but rather was told that if the pain was intolerable, she should go to the ER. brigid pgy1 I spoke to  Dr. Joey Garcia, the partner or pt's neurologist, who stated that the pt was not sent directly for admission for MRI, but rather was told that if the pain was intolerable, she should go to the ER. Dr. Garcia at bedside. pt attests to having her  at home who helps her move around, but would like to be admitted as Dr. Moreno "wanted to see the progress for follow up MRI" and is open to inpatient rehabilitation to improve her functional status/mobility. will consult ortho as well. discussed CT findings with ortho resident. they are aware of L4 fracture, CT was performed to evaluate degree of retropulsion. no need for MRI at this time nor admission. Pt will follow up outpatient with back brace and weight bearing as tolerated. Social Work on the case has attested that pt is a candidate for home PT as requested by physical therapy. physical prescription written for this indication. Pt agrees to new rx for lyrica and home pt to manage her chronic symptoms and to continue to use the walker at home with the support of her .

## 2022-01-03 NOTE — PHYSICAL THERAPY INITIAL EVALUATION ADULT - PHYSICAL ASSIST/NONPHYSICAL ASSIST: GAIT, REHAB EVAL
max verbal cues to increase step length, speed/supervision/verbal cues/nonverbal cues (demo/gestures)

## 2022-01-03 NOTE — PHYSICAL THERAPY INITIAL EVALUATION ADULT - GENERAL OBSERVATIONS, REHAB EVAL
Pt presenting with LBP, X-ray LS showing age-indeterminate L1, L2, L4, L5 VCFs. per ortho consult note pt is WBAT with assistive devices and LSO to be worn for comfort. Per resident alyssa rainey-PT can work with pt.Pt received semi-supine on stretcher in NAD, VSS, A&Ox4, agreebale to PT eval, educated on log rolling with good understanding.

## 2022-01-03 NOTE — CONSULT NOTE ADULT - SUBJECTIVE AND OBJECTIVE BOX
Patient is a 66y Female who presents c/o BLLE Radiculopathy. Denies HS/LOC. Denies pain/injury elsewhere. Denies numbness/tingling/paresthesias/weakness. Has chronic urinary incontinence, no bowel incontinence. Denies fevers/chills. No other complaints at this time.    HEALTH ISSUES - PROBLEM Dx:          MEDICATIONS  (STANDING):  acetaminophen     Tablet .. 975 milliGRAM(s) Oral once  ketorolac   Injectable 15 milliGRAM(s) IV Push Once      Allergies    No Known Allergies    Intolerances        PAST MEDICAL & SURGICAL HISTORY:  Diabetes    Vertigo          Vital Signs Last 24 Hrs  T(C): 36.8 (01-03-22 @ 08:53), Max: 36.8 (01-03-22 @ 05:22)  T(F): 98.3 (01-03-22 @ 08:53), Max: 98.3 (01-03-22 @ 05:22)  HR: 96 (01-03-22 @ 08:53) (96 - 97)  BP: 155/95 (01-03-22 @ 08:53) (146/66 - 155/95)  BP(mean): --  RR: 18 (01-03-22 @ 08:53) (16 - 18)  SpO2: 99% (01-03-22 @ 08:53) (98% - 100%)    Gen: NAD  Spine PE:  Skin intact  No gross deformity  Midline TTP Lumbar spine  No bony step offs  No paraspinal muscle ttp/hypertonicity   Negative clonus  Negative babinski  Negative mccloud  No saddle anesthesia    Motor:                   C5                C6              C7               C8           T1   R            5/5                5/5            5/5             5/5          5/5  L             5/5               5/5             5/5             5/5          5/5                L2             L3             L4               L5            S1  R         5/5           5/5          5/5             5/5           5/5  L          5/5          5/5           5/5             5/5           5/5    Sensory:            C5         C6         C7      C8       T1        (0=absent, 1=impaired, 2=normal, NT=not testable)  R         2            2           2        2         2  L          2            2           2        2         2               L2          L3         L4      L5       S1         (0=absent, 1=impaired, 2=normal, NT=not testable)  R         2            2            2        2        2  L          2            2           2        2         2                          12.0   8.80  )-----------( 303      ( 03 Jan 2022 09:06 )             38.9                 Imaging:   No imaging    A/P: 66y Female with BLLE Radiculopathy  Will f/u with Dr. Saha to see if MRI or further imaging is warranted  Clinical exam re-assuring no acute cord compression/low likelihood of cauda equina  Pain control  WBAT with assistive devices as needed  FU Labs/imaging  SCDs      Patient is a 66y Female who presents c/o BLLE Radiculopathy. Denies HS/LOC. Denies pain/injury elsewhere. Denies numbness/tingling/paresthesias/weakness. Has chronic urinary incontinence, no bowel incontinence. Denies fevers/chills. No other complaints at this time.    HEALTH ISSUES - PROBLEM Dx:          MEDICATIONS  (STANDING):  acetaminophen     Tablet .. 975 milliGRAM(s) Oral once  ketorolac   Injectable 15 milliGRAM(s) IV Push Once      Allergies    No Known Allergies    Intolerances        PAST MEDICAL & SURGICAL HISTORY:  Diabetes    Vertigo          Vital Signs Last 24 Hrs  T(C): 36.8 (01-03-22 @ 08:53), Max: 36.8 (01-03-22 @ 05:22)  T(F): 98.3 (01-03-22 @ 08:53), Max: 98.3 (01-03-22 @ 05:22)  HR: 96 (01-03-22 @ 08:53) (96 - 97)  BP: 155/95 (01-03-22 @ 08:53) (146/66 - 155/95)  BP(mean): --  RR: 18 (01-03-22 @ 08:53) (16 - 18)  SpO2: 99% (01-03-22 @ 08:53) (98% - 100%)    Gen: NAD  Spine PE:  Skin intact  No gross deformity  Midline TTP Lumbar spine  No bony step offs  No paraspinal muscle ttp/hypertonicity   Negative clonus  Negative babinski  Negative mccloud  No saddle anesthesia    Motor:                   C5                C6              C7               C8           T1   R            5/5                5/5            5/5             5/5          5/5  L             5/5               5/5             5/5             5/5          5/5                L2             L3             L4               L5            S1  R         5/5           5/5          5/5             5/5           5/5  L          5/5          5/5           5/5             5/5           5/5    Sensory:            C5         C6         C7      C8       T1        (0=absent, 1=impaired, 2=normal, NT=not testable)  R         2            2           2        2         2  L          2            2           2        2         2               L2          L3         L4      L5       S1         (0=absent, 1=impaired, 2=normal, NT=not testable)  R         2            2            2        2        2  L          2            2           2        2         2                          12.0   8.80  )-----------( 303      ( 03 Jan 2022 09:06 )             38.9                 Imaging:   MR Porter ([facility]): T12-L1 VCF, moderate stenosis    A/P: 66y Female with BLLE Radiculopathy  Will f/u with Dr. Saha to see if MRI or further imaging is warranted  Clinical exam re-assuring no acute cord compression/low likelihood of cauda equina  Pain control  WBAT with assistive devices as needed  FU Labs/imaging  SCDs      ADDENDUM:     D/w Dr. Saha  Recommend Decadron, PT/OT, pain management, no surgical intervention at this time  F/u in the office with Dr. Saha   Ortho to sign off please re-consult as needed    Patient is a 66y Female who presents c/o BLLE Radiculopathy. Denies HS/LOC. Denies pain/injury elsewhere. Denies numbness/tingling/paresthesias/weakness. Has chronic urinary incontinence, no bowel incontinence. Denies fevers/chills. No other complaints at this time.    HEALTH ISSUES - PROBLEM Dx:          MEDICATIONS  (STANDING):  acetaminophen     Tablet .. 975 milliGRAM(s) Oral once  ketorolac   Injectable 15 milliGRAM(s) IV Push Once      Allergies    No Known Allergies    Intolerances        PAST MEDICAL & SURGICAL HISTORY:  Diabetes    Vertigo          Vital Signs Last 24 Hrs  T(C): 36.8 (01-03-22 @ 08:53), Max: 36.8 (01-03-22 @ 05:22)  T(F): 98.3 (01-03-22 @ 08:53), Max: 98.3 (01-03-22 @ 05:22)  HR: 96 (01-03-22 @ 08:53) (96 - 97)  BP: 155/95 (01-03-22 @ 08:53) (146/66 - 155/95)  BP(mean): --  RR: 18 (01-03-22 @ 08:53) (16 - 18)  SpO2: 99% (01-03-22 @ 08:53) (98% - 100%)    Gen: NAD  Spine PE:  Skin intact  No gross deformity  Midline TTP Lumbar spine  No bony step offs  No paraspinal muscle ttp/hypertonicity   Negative clonus  Negative babinski  Negative mccloud  No saddle anesthesia    Motor:                   C5                C6              C7               C8           T1   R            5/5                5/5            5/5             5/5          5/5  L             5/5               5/5             5/5             5/5          5/5                L2             L3             L4               L5            S1  R         5/5           5/5          5/5             5/5           5/5  L          5/5          5/5           5/5             5/5           5/5    Sensory:            C5         C6         C7      C8       T1        (0=absent, 1=impaired, 2=normal, NT=not testable)  R         2            2           2        2         2  L          2            2           2        2         2               L2          L3         L4      L5       S1         (0=absent, 1=impaired, 2=normal, NT=not testable)  R         2            2            2        2        2  L          2            2           2        2         2                          12.0   8.80  )-----------( 303      ( 03 Jan 2022 09:06 )             38.9                 Imaging:   MR Porter ([facility]): T12-L1 VCF, moderate stenosis    A/P: 66y Female with age-indeterminate L1, L2, L4, L5 VCFs  Will f/u with Dr. Saha to see if MRI or further imaging is warranted  Clinical exam re-assuring no acute cord compression/low likelihood of cauda equina  Pain control  WBAT with assistive devices as needed  FU Labs/imaging  SCDs      ADDENDUM:     D/w Dr. Saha  LSO for comfort only, can continue to be WBAT  Recommend Decadron, PT/OT, pain management, no surgical intervention at this time  F/u in the office with Dr. Saha   Ortho to sign off please re-consult as needed    Patient is a 66y Female who presents c/o BLLE Radiculopathy. Denies HS/LOC. Denies pain/injury elsewhere. Denies numbness/tingling/paresthesias/weakness. Has chronic urinary incontinence, no bowel incontinence. Denies fevers/chills. No other complaints at this time.    HEALTH ISSUES - PROBLEM Dx:          MEDICATIONS  (STANDING):  acetaminophen     Tablet .. 975 milliGRAM(s) Oral once  ketorolac   Injectable 15 milliGRAM(s) IV Push Once      Allergies    No Known Allergies    Intolerances        PAST MEDICAL & SURGICAL HISTORY:  Diabetes    Vertigo          Vital Signs Last 24 Hrs  T(C): 36.8 (01-03-22 @ 08:53), Max: 36.8 (01-03-22 @ 05:22)  T(F): 98.3 (01-03-22 @ 08:53), Max: 98.3 (01-03-22 @ 05:22)  HR: 96 (01-03-22 @ 08:53) (96 - 97)  BP: 155/95 (01-03-22 @ 08:53) (146/66 - 155/95)  BP(mean): --  RR: 18 (01-03-22 @ 08:53) (16 - 18)  SpO2: 99% (01-03-22 @ 08:53) (98% - 100%)    Gen: NAD  Spine PE:  Skin intact  No gross deformity  Midline TTP Lumbar spine  No bony step offs  No paraspinal muscle ttp/hypertonicity   Negative clonus  Negative babinski  Negative mccloud  No saddle anesthesia    Motor:                   C5                C6              C7               C8           T1   R            5/5                5/5            5/5             5/5          5/5  L             5/5               5/5             5/5             5/5          5/5                L2             L3             L4               L5            S1  R         5/5           5/5          5/5             5/5           5/5  L          5/5          5/5           5/5             5/5           5/5    Sensory:            C5         C6         C7      C8       T1        (0=absent, 1=impaired, 2=normal, NT=not testable)  R         2            2           2        2         2  L          2            2           2        2         2               L2          L3         L4      L5       S1         (0=absent, 1=impaired, 2=normal, NT=not testable)  R         2            2            2        2        2  L          2            2           2        2         2                          12.0   8.80  )-----------( 303      ( 03 Jan 2022 09:06 )             38.9                 Imaging:   MR Porter ([facility]): T12-L1 VCF, moderate stenosis    A/P: 66y Female with age-indeterminate L1, L2, L4, L5 VCFs  Will f/u with Dr. Saha to see if MRI or further imaging is warranted  Clinical exam re-assuring no acute cord compression/low likelihood of cauda equina  Pain control  WBAT with assistive devices as needed  FU Labs/imaging  SCDs      ADDENDUM:     D/w Dr. Saha  LSO for comfort only, can continue to be WBAT  Recommend Decadron, PT/OT, pain management, no surgical intervention at this time  F/u in the office with Dr. Saha   Ortho to sign off please re-consult as needed    I discussed and agree with the above, Dr. Too Saha

## 2022-01-03 NOTE — ED PROVIDER NOTE - NSFOLLOWUPINSTRUCTIONS_ED_ALL_ED_FT
You were seen in the Emergency Department for back pain.     1) Advance activity as tolerated.   2) Continue all previously prescribed medications as directed.    3) Follow up with your primary care physician in 24-48 hours - take copies of your results.    4) Return to the Emergency Department for worsening or persistent symptoms, and/or ANY NEW OR CONCERNING SYMPTOMS.    Please follow up with orthopedics this week.  the lyrica from your pharmacy and take it as prescribed for the numbness and tingling. There is a prescription in place for physical therapy to come to your house.     Back Pain    Back pain is very common in adults. The cause of back pain is rarely dangerous and the pain often gets better over time. The cause of your back pain may not be known and may include strain of muscles or ligaments, degeneration of the spinal disks, or arthritis. Occasionally the pain may radiate down your leg(s). Over-the-counter medicines to reduce pain and inflammation are often the most helpful. Stretching and remaining active frequently helps the healing process.     SEEK IMMEDIATE MEDICAL CARE IF YOU HAVE ANY OF THE FOLLOWING SYMPTOMS: bowel or bladder control problems, unusual weakness or numbness in your arms or legs, nausea or vomiting, abdominal pain, fever, dizziness/lightheadedness.

## 2022-01-03 NOTE — CONSULT NOTE ADULT - SUBJECTIVE AND OBJECTIVE BOX
Admitting Diagnosis:      HPI:  This is a 66y year old Female with the below past medical history who presents with the chief complaint of low back pain and gait anl.  She sees my associate Dr. Proctor as she has increasing low back and leg pain in late 2021.  She describes pain in the back that radiates into the legs, sharp in quality.  She was seen by Dr brunner pain management, was tried on cyclobenzaprine, neurontin without relief, stopped all but tramadol.  She was seen by Dr Saha who as per report reviewed her mrs from sept and told her she had spinal stenosis.  However increasing pain in low back, worse when tries to stand and walk.  Better if sitting or lying down.  No other clear exacerbating factors.  She feels the legs are weaker and now needs a rolling walker.  She told my associate when seen on 12/30 no numbness, she says unclear if some at the level of her rectum.  She describes urinary incontinence but appears to be that she cannot get to the bathroom in time.  No bowel issues beyond longstanding constipation.  She feels tramadol may help but makes her confused.  No new issues with speech, swallow, vision, or sx involving the arms. When seen in the office on 12/30 she said she did not want an outpt workup, referred to ER as pt walking has become more difficult.       Past Medical History:  Diabetes [E11.9]    Vertigo [R42]        Past Surgical History:      Social History:  No toxic habits    Family History:  FAMILY HISTORY:      Allergies:  No Known Allergies      ROS:  Constitutional: Patient offers no complaints of fevers or significant weight loss  Ears, Nose, Mouth and Throat: The patient presents with no abnormalities of the head, ears, eyes, nose or throat  Skin: Patient offers no concerns of new rashes or lesions  Respiratory: The patient presents with no abnormalities of the respiratory tract  Cardiovascular: The patient presents with no cardiac abnormalities  Gastrointestinal: The patient presents with no abnormalities of the GI system  Genitourinary: The patient presents with no dysuria, hematuria or frequent urination  Neurological: See HPI  Endocrine: Patient offers no complaints of excessive thirst, urination, or heat/cold intolerance    Advanced care planning reviewed and noted in the chart.    Medications:  acetaminophen     Tablet .. 975 milliGRAM(s) Oral once  ketorolac   Injectable 15 milliGRAM(s) IV Push Once      Labs:          CAPILLARY BLOOD GLUCOSE              Female    Vitals:  Vital Signs Last 24 Hrs  T(C): 36.8 (03 Jan 2022 08:53), Max: 36.8 (03 Jan 2022 05:22)  T(F): 98.3 (03 Jan 2022 08:53), Max: 98.3 (03 Jan 2022 05:22)  HR: 96 (03 Jan 2022 08:53) (96 - 97)  BP: 155/95 (03 Jan 2022 08:53) (146/66 - 155/95)  BP(mean): --  RR: 18 (03 Jan 2022 08:53) (16 - 18)  SpO2: 99% (03 Jan 2022 08:53) (98% - 100%)    NEUROLOGICAL EXAM:    Mental status: Awake, alert, and in no apparent distress. Oriented to person, place and time. Language function is normal. Recent memory, digit span and concentration were normal.     Cranial Nerves: Pupils were equal, round, reactive to light. Extraocular movements were intact. Visual field were full. Fundoscopic exam was deferred. Facial sensation was intact to light touch. There was no facial asymmetry. The palate was upgoing symmetrically and tongue was midline. Hearing acuity was intact to finger rub AU. Shoulder shrug was full bilaterally    Motor exam: Bulk and tone were normal. Strength was 5/5 in all four extremities except hip flexion is 4+/5 in setting of pain. Fine finger movements were symmetric and normal. There was no pronator drift    Reflexes: 2+ in the bilateral upper extremities. 2+ in the bilateral lower extremities. Toes were downgoing bilaterally.     Sensation: Intact to light touch, temperature, vibration and proprioception.     Coordination: Finger-nose-finger and heel-to-shin was without dysmetria.     Gait: deferred (done by jacquelyn tierney, told me was shuffling, slow to point she urinated on herself trying to get to the bathroom)  no pain on low back palpation, no numbness or sensory level on lower back/buttocks

## 2022-01-03 NOTE — PHYSICAL THERAPY INITIAL EVALUATION ADULT - GAIT DEVIATIONS NOTED, PT EVAL
shuffling gait with very decreased step length/increased time in double stance/decreased step length

## 2022-01-03 NOTE — CHART NOTE - NSCHARTNOTEFT_GEN_A_CORE
EMERGENCY : SW consulted by MD Zabala as patient cleared for discharge, seen by PT and recommended for home PT services. LMSW reviewed patient's chart. As per chart review patient is a "67yo female controlled NIDDM2 c chronic back pain" who presents for "back pain general." LMSW met with patient at bedside and introduced self and role to which she verbalized understanding. Patient is A&Ox4 at this time. Patient lives in a private home in [place], [place] with her  who she identifies as her caregiver and emergency contact. Patient's  is Cash Oliver PH: [phone]. Patient declines for LMSW to contact  and endorses good communication with him. She has 3 steps to enter the home and 10 steps inside the home to her bedroom. She performs adl's on her own and uses a RW to ambulate. She states that should she have any needs, her  is willing and available to assist her. She endorses managing well at home and is aware she is recommended for home PT and is receptive to [facility] referral. As per MD, patient with no skilled nursing home care needs at present. LMSW sent referral to [facility]. Patient states her  will pick her up when ready for D/C. MD made aware. Contact information for social work provided. SW continues to remain available for any needs.

## 2022-01-03 NOTE — ED PROVIDER NOTE - CLINICAL SUMMARY MEDICAL DECISION MAKING FREE TEXT BOX
pt with chronic back pain presents with recently increasing instability requiring a walker x3-4w with functional incontinence. no saddle anesthesia or bowel incontinence. pt with chronic back pain presents with recently increasing instability requiring a walker x3-4w with functional incontinence. no saddle anesthesia or bowel incontinence.  Otf: Patient with chronic back pain x months now with worsening pain for past few weeks. has been following with ortho spine, neuro advised to come in by her neuorlogist. had mri in september per patient.  patient denies bowel/bladder incontinence.  no numbness/tingling down legs. c/o tingling to b/l feet.  no fevers, no chills, ttp midline lumbar spine. ambulating with assistance in the ER. no falls. will get labs, pain control, ortho consult, reassess.

## 2022-01-03 NOTE — ED PROVIDER NOTE - NS ED ROS FT
Constitutional: no fevers, chills  HEENT: no HA, vision changes, rhinorrhea, sore throat  Cardiac: no chest pain, palpitations  Respiratory: no SOB, cough or hemoptysis  GI: no n/v/d/c, abd pain, bloody or dark stools  : no dysuria, frequency, or hematuria  MSK: no joint pain, neck pain +back pain  Skin: no rashes, jaundice, pruritis  Neuro: +numbness/tingling, weakness, unsteady gait  ros otherwise neg except per hpi

## 2022-01-03 NOTE — PHYSICAL THERAPY INITIAL EVALUATION ADULT - PERTINENT HX OF CURRENT PROBLEM, REHAB EVAL
66y female controlled NIDDM2 c chronic back pain now requiring walker x3-4w due to worsening gait. worse with movement improved with rest. sharp tingling 8/10 pain. incontinence mixed functional v neurogenic (no urge until too late without saddle anesthesia and difficulty getting to toilet in time. X-ray LS showing age-indeterminate L1, L2, L4, L5 VCFs. Per ortho consult note-LSO for comfort only, can continue to be WBAT. WBAT with assistive devices as needed.

## 2022-01-04 LAB
CULTURE RESULTS: SIGNIFICANT CHANGE UP
SPECIMEN SOURCE: SIGNIFICANT CHANGE UP

## 2022-01-05 LAB — VIT B12 SERPL-MCNC: 606 PG/ML — SIGNIFICANT CHANGE UP (ref 232–1245)

## 2022-03-21 ENCOUNTER — APPOINTMENT (OUTPATIENT)
Dept: ORTHOPEDIC SURGERY | Facility: CLINIC | Age: 67
End: 2022-03-21
Payer: MEDICARE

## 2022-03-21 VITALS
DIASTOLIC BLOOD PRESSURE: 82 MMHG | SYSTOLIC BLOOD PRESSURE: 131 MMHG | HEART RATE: 97 BPM | WEIGHT: 165 LBS | HEIGHT: 65 IN | BODY MASS INDEX: 27.49 KG/M2

## 2022-03-21 DIAGNOSIS — M54.50 LOW BACK PAIN, UNSPECIFIED: ICD-10-CM

## 2022-03-21 PROCEDURE — 99215 OFFICE O/P EST HI 40 MIN: CPT

## 2022-03-21 NOTE — ED PROVIDER NOTE - DATE/TIME 2
If she is comfortable doing this as a virtual visit I can certainly order imaging and lab work virtually.  Please let me know if she is not comfortable with a virtual visit and I can see where I can try to squeeze her in.  Robyn Chung PA-C    16-Dec-2021 22:47

## 2022-03-28 ENCOUNTER — APPOINTMENT (OUTPATIENT)
Dept: ORTHOPEDIC SURGERY | Facility: CLINIC | Age: 67
End: 2022-03-28

## 2022-04-04 ENCOUNTER — TRANSCRIPTION ENCOUNTER (OUTPATIENT)
Age: 67
End: 2022-04-04

## 2022-05-21 ENCOUNTER — NON-APPOINTMENT (OUTPATIENT)
Age: 67
End: 2022-05-21

## 2022-05-25 ENCOUNTER — APPOINTMENT (OUTPATIENT)
Dept: ORTHOPEDIC SURGERY | Facility: CLINIC | Age: 67
End: 2022-05-25
Payer: MEDICARE

## 2022-05-25 PROCEDURE — 99214 OFFICE O/P EST MOD 30 MIN: CPT

## 2022-05-25 NOTE — HISTORY OF PRESENT ILLNESS
[Improving] : improving [de-identified] : 67 year female presents for evaluation of lower back pain.\par Last seen in Dec 2021 for lumbar radiculopathy. \par Presents today for evaluation of L5 compression fracture.  Referred by Dr. Lev Proctor. \par Denies any injury.\par Has history of osteoporosis, not on medications for this.\par Getting better. \par She does have complaints of B/L LE radiating leg pain to the feet.\par Has numbness/tingling of the legs.\par Takes natural medications for the pain. \par Here with her .\par [facility].\par No fever chills sweats nausea vomiting no bowel or bladder dysfunction, no recent weight loss or gain no night pain. This history is in addition to the intake form that I personally reviewed.

## 2022-05-25 NOTE — DISCUSSION/SUMMARY
[Medication Risks Reviewed] : Medication risks reviewed [Surgical risks reviewed] : Surgical risks reviewed [de-identified] : L2-5 Lumbar stenosis \par Multiple lumbar fractures\par Lumbar degenerative disc disease .\par Feeling better.\par Discussed all options.\par Surgery discussed.\par F/u after Pain management evaluation-2 months.\par All options discussed including rest, medicine, home exercise, acupuncture, Chiropractic care, Physical Therapy, Pain management, and last resort surgery. All questions were answered, all alternatives discussed and the patient is in complete agreement with that plan. Follow-up appointment as instructed. Any issues and the patient will call or come in sooner. \par  agrees with the plan.

## 2022-05-25 NOTE — PHYSICAL EXAM
[Normal] : Gait: normal [Walker] : ambulates with walker [Weber's Sign] : negative Weber's sign [Pronator Drift] : negative pronator drift [SLR] : negative straight leg raise [de-identified] : 5 out of 5 motor strength, sensation is intact and symmetrical full range of motion flexion extension and rotation, no palpatory tenderness full range of motion of hips knees shoulders and elbows (all four extremities), no atrophy, negative straight leg raise, no pathological reflexes, no swelling, normal ambulation, no apparent distress skin is intact, no palpable lymph nodes, no upper or lower extremity instability, alert and oriented x3 and normal mood. Normal finger-to nose test. Mnimal stiffness present over lower back. [de-identified] : MRI lumbar-L5 fracture and sever L4-5 stenosis. L1-4 fractures.

## 2022-07-21 PROBLEM — M48.07 LUMBOSACRAL STENOSIS: Status: ACTIVE | Noted: 2022-03-21

## 2022-07-27 ENCOUNTER — APPOINTMENT (OUTPATIENT)
Dept: ORTHOPEDIC SURGERY | Facility: CLINIC | Age: 67
End: 2022-07-27

## 2022-07-27 DIAGNOSIS — M51.36 OTHER INTERVERTEBRAL DISC DEGENERATION, LUMBAR REGION: ICD-10-CM

## 2022-07-27 DIAGNOSIS — S32.009A UNSPECIFIED FRACTURE OF UNSPECIFIED LUMBAR VERTEBRA, INITIAL ENCOUNTER FOR CLOSED FRACTURE: ICD-10-CM

## 2022-07-27 DIAGNOSIS — M48.07 SPINAL STENOSIS, LUMBOSACRAL REGION: ICD-10-CM

## 2022-07-27 PROCEDURE — 99214 OFFICE O/P EST MOD 30 MIN: CPT

## 2022-07-27 NOTE — HISTORY OF PRESENT ILLNESS
[Improving] : improving [de-identified] : 67 year female presents for follow up evaluation of lower back pain. \par Multiple lumbar fractures.   \par L5 compression fracture.\par L2-5 Lumbar stenosis. Lumbar degenerative disc disease. \par Last seen in May 25/2022 for lumbar radiculopathy. \par Patient reports she is feeling better. \par She had NABEEL 4/28/22, which helped. \par PT and walking are helping. \par Pain is worsened with prolonged sitting. \par Denies any injury.\par Has history of osteoporosis, not on medications for this.\par Getting better. \par She does have complaints of B/L LE radiating leg pain to the feet.\par Has numbness/tingling of the legs.\par Takes natural medications for the pain. \par Here with her .\par [facility]. \par Previously referred by Dr. Lev Prcotor. \par No fever chills sweats nausea vomiting no bowel or bladder dysfunction, no recent weight loss or gain no night pain. This history is in addition to the intake form that I personally reviewed.

## 2022-07-27 NOTE — PHYSICAL EXAM
[Normal] : Gait: normal [Walker] : ambulates with walker [Weber's Sign] : negative Weber's sign [Pronator Drift] : negative pronator drift [SLR] : negative straight leg raise [de-identified] : 5 out of 5 motor strength, sensation is intact and symmetrical full range of motion flexion extension and rotation, no palpatory tenderness full range of motion of hips knees shoulders and elbows (all four extremities), no atrophy, negative straight leg raise, no pathological reflexes, no swelling, normal ambulation, no apparent distress skin is intact, no palpable lymph nodes, no upper or lower extremity instability, alert and oriented x3 and normal mood. Normal finger-to nose test. Minimal stiffness present over lower back.

## 2022-07-27 NOTE — DISCUSSION/SUMMARY
[Medication Risks Reviewed] : Medication risks reviewed [Surgical risks reviewed] : Surgical risks reviewed [de-identified] : L2-5 Lumbar stenosis \par Multiple lumbar fractures\par Lumbar degenerative disc disease .\par Feeling better.\par Discussed all options.\par Continue PT.\par Surgery discussed.\par F/u after Pain management evaluation-2 months.\par All options discussed including rest, medicine, home exercise, acupuncture, Chiropractic care, Physical Therapy, Pain management, and last resort surgery. All questions were answered, all alternatives discussed and the patient is in complete agreement with that plan. Follow-up appointment as instructed. Any issues and the patient will call or come in sooner. \par  agrees with the plan.

## 2022-10-26 ENCOUNTER — NON-APPOINTMENT (OUTPATIENT)
Age: 67
End: 2022-10-26

## 2022-11-15 ENCOUNTER — APPOINTMENT (OUTPATIENT)
Dept: OBGYN | Facility: CLINIC | Age: 67
End: 2022-11-15

## 2022-11-15 VITALS
SYSTOLIC BLOOD PRESSURE: 157 MMHG | HEIGHT: 65 IN | WEIGHT: 157.13 LBS | BODY MASS INDEX: 26.18 KG/M2 | DIASTOLIC BLOOD PRESSURE: 90 MMHG

## 2022-11-15 LAB
BILIRUB UR QL STRIP: NORMAL
GLUCOSE UR-MCNC: NORMAL
HCG UR QL: 1 EU/DL
HGB UR QL STRIP.AUTO: NORMAL
KETONES UR-MCNC: NORMAL
LEUKOCYTE ESTERASE UR QL STRIP: NORMAL
NITRITE UR QL STRIP: NORMAL
PH UR STRIP: 6
PROT UR STRIP-MCNC: NORMAL
SP GR UR STRIP: 1.02

## 2022-11-15 PROCEDURE — 99213 OFFICE O/P EST LOW 20 MIN: CPT

## 2022-11-17 LAB — BACTERIA UR CULT: NORMAL

## 2022-12-07 DIAGNOSIS — R92.2 INCONCLUSIVE MAMMOGRAM: ICD-10-CM

## 2022-12-14 ENCOUNTER — TRANSCRIPTION ENCOUNTER (OUTPATIENT)
Age: 67
End: 2022-12-14

## 2023-01-19 ENCOUNTER — TRANSCRIPTION ENCOUNTER (OUTPATIENT)
Age: 68
End: 2023-01-19

## 2023-01-19 DIAGNOSIS — R92.8 OTHER ABNORMAL AND INCONCLUSIVE FINDINGS ON DIAGNOSTIC IMAGING OF BREAST: ICD-10-CM

## 2023-02-18 NOTE — ED ADULT TRIAGE NOTE - BP NONINVASIVE SYSTOLIC (MM HG)
OFFICE VISIT    Patient: Young Bucio   : 1985 MRN: [id number]    SUBJECTIVE:  Chief Complaint   Patient presents with   • Asthma     Needs inhaler and other options as well       A 37 year old male presents for a follow-up of asthma, obesity, and an evaluation of right abdominal pain.    Patient has given consent to record this visit for documentation in their clinical record.    HISTORY OF PRESENT ILLNESS:  Historian: Self.    1. Mild intermittent asthma without complication:  Was prescribed albuterol inhaler in the last visit. Admits he uses it too frequently. Can not come back for prescription due to his wife company changed insurance agency and was waiting for his company insurance. Request for inhaler refill.  Reports had an asthma attack when he came back to house after shoveling snow for one hour last night. Was not able to breathe, sat down took 7-8 deep breath and was normal. He avoided to use inhaler on advise of his wife. Mentions when he lies flat has to wake up due to cough with or without phlegm and shortness of breath  Hes on regular OTC allergy medicine with benefits.   Triggers include Cold air and cats. Has 2 cats at home.     2. Routine health maintenance:  Vaccinations: Due for pneumonia, tetanus vaccine, and COVID-19 vaccine.  Investigations: His previous blood report when he had kidney stone was abnormal.    3. Obesity (BMI 30.0-34.9):  Snore at night. Mentions snoring when lying on the back, but does not snore when lying sideways.    4. History of nephrolithiasis:  Has a history of renal calculi. Mentions stone came out on its own without doing anything. Stone was not analyzed. Mention does not take any vitamins tablet.     5. Right flank pain:  Has occasional abdominal pain on right side sometimes radiates to the right groin.  Had noticed there is no pain when he is enough hydrated. He avoids drinking carbonated water. Denies any abdominal surgery. Has intact gallbladder.      PAST  MEDICAL HISTORY:  Past Medical History:   Diagnosis Date   • Allergy    • Asthma      MEDICATIONS:  Current Outpatient Medications   Medication Sig Dispense Refill   • albuterol 108 (90 Base) MCG/ACT inhaler Inhale 2 puffs into the lungs every 4 hours as needed for Shortness of Breath or Wheezing. 1 each 0     No current facility-administered medications for this visit.     ALLERGIES:  Allergies as of 02/17/2023 - Reviewed 02/17/2023   Allergen Reaction Noted   • Cat dander Other (See Comments) 02/17/2023     FAMILY HISTORY:  Family History   Problem Relation Age of Onset   • Patient is unaware of any medical problems Mother    • Patient is unaware of any medical problems Father      SOCIAL HISTORY:  Social History     Tobacco Use   • Smoking status: Never   • Smokeless tobacco: Never   Vaping Use   • Vaping Use: never used   Substance Use Topics   • Alcohol use: Never   • Drug use: Never     Past Surgical HISTORY  Past Surgical History:   Procedure Laterality Date   • No past surgeries         REVIEW OF SYSTEMS:  Constitutional: Per HPI.  Respiratory: Per HPI.  Musculoskeletal: Per HPI.  Allergic/Immunologic: Per HPI.    OBJECTIVE:  Visit Vitals  /78   Pulse 65   Temp 97.3 °F (36.3 °C) (Temporal)   Resp 16   Ht 5' 6\" (1.676 m)   Wt 93 kg (205 lb)   SpO2 98%   BMI 33.09 kg/m²       PHYSICAL EXAM:  Constitutional: alert, in no acute distress and current vital signs reviewed.   Head and Face: atraumatic, no deformities, normocephalic, normal facies.   Eyes: no discharge, normal conjunctiva, no eyelid swelling, no ptosis and the sclerae were normal.  ENT: normal appearing outer ear, normal appearing nose. Examination of the tympanic membrane showed normal landmarks, normal appearing external canal. Nasal mucosa moist and pink, no nasal discharge. Normal lips. Oral mucosa pink and moist, no oral lesions.   Neck: normal appearing neck, supple neck and no mass was seen. Thyroid not enlarged and no thyroid nodules.    Lymphatic: no lymphadenopathy.   Pulmonary: no respiratory distress, normal respiratory rate and effort and no accessory muscle use. Breath sounds clear to auscultation bilaterally.   Cardiovascular: normal rate, no murmurs were heard, regular rhythm, normal S1 and normal S2. Edema was not present in the lower extremities.   Abdomen: soft, nontender, nondistended, normal bowel sounds and no abdominal mass. No hepatomegaly and no splenomegaly. No umbilical hernia was discovered.   Musculoskeletal: normal gait. No musculoskeletal erythema was seen, no joint swelling seen and no joint tenderness was elicited. No scoliosis. Normal range of motion. There was no joint instability noted. Muscle strength and tone were normal.   Neurologic: cranial nerves grossly intact. Normal DTRs. No sensory deficits noted. No coordination deficits. Normal gait. Muscle strength and tone were normal.   Psychiatric: oriented to person, oriented to place and oriented to time. Alert and awake, interactive and mood/affect were appropriate. Judgement not impaired. Normal attention span. Short term memory intact.   Skin, Hair, Nails: normal skin color and pigmentation and no rash. No foot ulcers and no skin ulcer was seen. Normal skin turgor. No clubbing or cyanosis of the fingernails.      ASSESSMENT AND PLAN:  This 37 year old male presents with :  1. Routine health maintenance    2. Mild intermittent asthma without complication    3. Obesity (BMI 30.0-34.9)    4. History of nephrolithiasis    5. Right flank pain          1. Mild intermittent asthma without complication:  Currently not controlled.  Ordered PFT.  Prescribed albuterol 108 (90 base) mcg/act inhaler; inhale 2 puffs into the lungs every 4 hours as needed for shortness of breath or wheezing.  Advised to inhale 2 puffs before 15-20 minutes of any physical activity such as shoveling snow, exercise, etc.  Instructed to use inhaler whenever in need, avoiding to use in acute attack can  lead to respiratory failure.  Explained if he has to use more than one albuterol inhaler in a year that means his asthma is not controlled.  Explained as he's allergic, can try allergy shots.  Informed to go to the ER if has any worsening of symptoms.    2. Routine health maintenance:  Vaccinations: Deferred pneumonia, tetanus vaccine, and COVID-19 vaccine.  Labs: Ordered CBC with differential, comprehensive metabolic panel, lipid panel with reflex, glycohemoglobin.    3. Obesity (BMI 30.0-34.9):  Ordered CBC with differential, comprehensive metabolic panel, lipid panel with reflex, glycohemoglobin.  Will consider to check for sleep apnea.    4. History of nephrolithiasis:  Currently resolved.    5. Right flank pain:  Ordered US kidney right.  Ordered urinalysis and reflex microscopy with culture if indicated.  Ordered POCT urine dip non-auto.  Advised to go to the ER if having severe pain.    Follow up as needed.      Refer to orders.  Medical compliance with plan discussed and risks of non-compliance reviewed.  Patient education completed on disease process, etiology & prognosis.  Proper usage and side effects of medications reviewed & discussed.  Return to clinic as clinically indicated as discussed with the patient who verbalized understanding of the plan and is in agreement with the plan.    I,  Dr. Abhinav Ma, have created a visit summary document based on the audio recording between Dr. Kalina Hunter PA-C and this patient for the physician to review, edit as needed, and authenticate.    Creation Date: 2/18/2023       I have reviewed and edited the visit summary above and attest that it is accurate.    Kalina Hunter PA-C       [phone]

## 2023-04-25 ENCOUNTER — APPOINTMENT (OUTPATIENT)
Dept: UROGYNECOLOGY | Facility: CLINIC | Age: 68
End: 2023-04-25
Payer: MEDICARE

## 2023-04-25 VITALS
DIASTOLIC BLOOD PRESSURE: 80 MMHG | SYSTOLIC BLOOD PRESSURE: 129 MMHG | BODY MASS INDEX: 25.83 KG/M2 | HEART RATE: 89 BPM | HEIGHT: 65 IN | WEIGHT: 155 LBS

## 2023-04-25 DIAGNOSIS — R35.0 FREQUENCY OF MICTURITION: ICD-10-CM

## 2023-04-25 DIAGNOSIS — R39.15 URGENCY OF URINATION: ICD-10-CM

## 2023-04-25 DIAGNOSIS — N39.41 URGE INCONTINENCE: ICD-10-CM

## 2023-04-25 PROCEDURE — 99204 OFFICE O/P NEW MOD 45 MIN: CPT | Mod: 25

## 2023-04-25 PROCEDURE — 51701 INSERT BLADDER CATHETER: CPT

## 2023-04-25 NOTE — HISTORY OF PRESENT ILLNESS
[Rectal Prolapse] : no [Unable To Restrain Bowel Movement] : moderate [Feelings Of Urinary Urgency] : severe [x3+] : nocturia three or more  times a night [Urinary Tract Infection] : severe [] : yes [Uses ___ pads per day] : uses [unfilled] pad(s) per day [Constipation Obstructed Defecation] : no [de-identified] : 16 times a day [de-identified] : 4 times a night [FreeTextEntry1] : \par PMH: DM, headaches, mood disorder\par PSH: laparoscopic BTL\par \par on risperidone, valproate sodium\par daily fluid intake: 1-2 L water, 1 c coffee\par

## 2023-04-25 NOTE — DISCUSSION/SUMMARY
[FreeTextEntry1] : \par We discussed possible etiologies of her symptoms including overactive bladder. We reviewed the potential urinary side effects of antipsychotic medications.  I recommend she start behavioral modifications and bladder training. Written instructions on how to perform bladder training were provided.  We reviewed medications for overactive bladder. We also reviewed PTNS, botox, SNS. She is hesitant to start a daily medication due to her other medications. She is unsure how she would like to proceed and will call my office once she has made a treatment decision.\par \par If she opts for medication, will try Myrbetriq 50 mg or Gemtesa. I do not recommend anticholinergics as there are potential interactions with her current medications.  \par \par She requested additional information on botox. IU[facility] info provided to her. \par  \par IU[facility] information on bladder training and OAB provided to her as well \par \par \par Urine sent for micro UA and culture

## 2023-04-25 NOTE — PHYSICAL EXAM
[Chaperone Present] : A chaperone was present in the examining room during all aspects of the physical examination [Vulvar Atrophy] : vulvar atrophy [Labia Majora] : were normal [Normal Appearance] : general appearance was normal [Atrophy] : atrophy [Exam Deferred] : was deferred [FreeTextEntry1] : General: Well, appearing. Alert and orientated. No acute distress\par HEENT: Normocephalic, atraumatic and extraocular muscles appear to be intact \par Neck: Full range of motion, no obvious lymphadenopathy, deformities, or masses noted \par Respiratory: Speaking in full sentences comfortably, normal work of breathing and no cough during visit\par Musculoskeletal: active full range of motion in extremities \par Extremities: No upper extremity edema noted\par Skin: no obvious rash or skin lesions\par Neuro: Orientated X 3, speech is fluent, normal rate\par Psych: Normal mood and affect \par   [Tenderness] : ~T no ~M abdominal tenderness observed [Distended] : not distended [Normal] : normal [de-identified] : no prolapse

## 2023-04-27 ENCOUNTER — NON-APPOINTMENT (OUTPATIENT)
Age: 68
End: 2023-04-27

## 2023-04-27 DIAGNOSIS — Z82.49 FAMILY HISTORY OF ISCHEMIC HEART DISEASE AND OTHER DISEASES OF THE CIRCULATORY SYSTEM: ICD-10-CM

## 2023-04-27 DIAGNOSIS — Z87.19 PERSONAL HISTORY OF OTHER DISEASES OF THE DIGESTIVE SYSTEM: ICD-10-CM

## 2023-04-27 DIAGNOSIS — Z87.448 PERSONAL HISTORY OF OTHER DISEASES OF URINARY SYSTEM: ICD-10-CM

## 2023-04-27 LAB
APPEARANCE: CLEAR
BACTERIA UR CULT: NORMAL
BACTERIA: NEGATIVE /HPF
BILIRUBIN URINE: NEGATIVE
BLOOD URINE: NEGATIVE
CAST: 0 /LPF
COLOR: YELLOW
EPITHELIAL CELLS: 1 /HPF
GLUCOSE QUALITATIVE U: 500 MG/DL
KETONES URINE: NEGATIVE MG/DL
LEUKOCYTE ESTERASE URINE: NEGATIVE
MICROSCOPIC-UA: NORMAL
NITRITE URINE: NEGATIVE
PH URINE: 5.5
PROTEIN URINE: NEGATIVE MG/DL
RED BLOOD CELLS URINE: 2 /HPF
SPECIFIC GRAVITY URINE: 1.01
UROBILINOGEN URINE: 0.2 MG/DL
WHITE BLOOD CELLS URINE: 0 /HPF

## 2023-04-27 RX ORDER — PROPRANOLOL HYDROCHLORIDE 80 MG/1
80 TABLET ORAL
Refills: 0 | Status: ACTIVE | COMMUNITY

## 2023-04-27 RX ORDER — DIVALPROEX SODIUM 500 MG/1
500 TABLET, DELAYED RELEASE ORAL
Refills: 0 | Status: ACTIVE | COMMUNITY

## 2023-04-27 RX ORDER — RISPERIDONE 2 MG/1
2 TABLET, FILM COATED ORAL
Refills: 0 | Status: ACTIVE | COMMUNITY

## 2023-06-08 ENCOUNTER — APPOINTMENT (OUTPATIENT)
Dept: OBGYN | Facility: CLINIC | Age: 68
End: 2023-06-08
Payer: MEDICARE

## 2023-06-08 VITALS
DIASTOLIC BLOOD PRESSURE: 81 MMHG | HEIGHT: 65 IN | WEIGHT: 152 LBS | BODY MASS INDEX: 25.33 KG/M2 | SYSTOLIC BLOOD PRESSURE: 140 MMHG

## 2023-06-08 DIAGNOSIS — Z00.00 ENCOUNTER FOR GENERAL ADULT MEDICAL EXAMINATION W/OUT ABNORMAL FINDINGS: ICD-10-CM

## 2023-06-08 DIAGNOSIS — Z01.419 ENCOUNTER FOR GYNECOLOGICAL EXAMINATION (GENERAL) (ROUTINE) W/OUT ABNORMAL FINDINGS: ICD-10-CM

## 2023-06-08 PROCEDURE — 99397 PER PM REEVAL EST PAT 65+ YR: CPT

## 2023-06-08 NOTE — DISCUSSION/SUMMARY
[FreeTextEntry1] : 69 y/o postmenopausal woman \par Pap\par UTD with mammogram,1//2023  [facility] shortterm f/u with Rt breast US, referral given\par UTD with Colonoscopy \par BDS\par F/u 1 year/PRN\par

## 2023-06-08 NOTE — HISTORY OF PRESENT ILLNESS
[FreeTextEntry1] : 67 y/o postmenopausal woman \par no complaints, no VB, hx of urinary incontinence followed by \par UTD with mammogram,1//2023  [facility] shortterm f/u with Rt breast US\par UTD with Colonoscopy

## 2023-06-30 LAB — CYTOLOGY CVX/VAG DOC THIN PREP: ABNORMAL

## 2023-08-06 ENCOUNTER — TRANSCRIPTION ENCOUNTER (OUTPATIENT)
Age: 68
End: 2023-08-06

## 2023-08-07 DIAGNOSIS — N63.10 UNSPECIFIED LUMP IN THE RIGHT BREAST, UNSPECIFIED QUADRANT: ICD-10-CM

## 2023-08-14 ENCOUNTER — TRANSCRIPTION ENCOUNTER (OUTPATIENT)
Age: 68
End: 2023-08-14

## 2023-08-15 DIAGNOSIS — N63.10 UNSPECIFIED LUMP IN THE RIGHT BREAST, UNSPECIFIED QUADRANT: ICD-10-CM

## 2023-08-15 DIAGNOSIS — N63.0 UNSPECIFIED LUMP IN UNSPECIFIED BREAST: ICD-10-CM

## 2023-12-15 ENCOUNTER — APPOINTMENT (OUTPATIENT)
Dept: OBGYN | Facility: CLINIC | Age: 68
End: 2023-12-15
Payer: MEDICARE

## 2023-12-15 VITALS — DIASTOLIC BLOOD PRESSURE: 70 MMHG | SYSTOLIC BLOOD PRESSURE: 128 MMHG

## 2023-12-15 DIAGNOSIS — R10.2 PELVIC AND PERINEAL PAIN: ICD-10-CM

## 2023-12-15 PROCEDURE — 99213 OFFICE O/P EST LOW 20 MIN: CPT

## 2023-12-18 LAB
BACTERIA UR CULT: NORMAL
C TRACH RRNA SPEC QL NAA+PROBE: NOT DETECTED
N GONORRHOEA RRNA SPEC QL NAA+PROBE: NOT DETECTED
SOURCE AMPLIFICATION: NORMAL

## 2023-12-19 PROBLEM — R10.2 PELVIC PAIN IN FEMALE: Status: ACTIVE | Noted: 2023-12-19 | Resolved: 2024-01-18

## 2023-12-19 PROBLEM — R10.2 PELVIC PAIN IN FEMALE: Status: RESOLVED | Noted: 2023-12-15 | Resolved: 2023-12-19

## 2023-12-19 RX ORDER — CEPHALEXIN 500 MG/1
500 CAPSULE ORAL
Qty: 30 | Refills: 0 | Status: ACTIVE | COMMUNITY
Start: 2023-12-19 | End: 1900-01-01

## 2023-12-19 NOTE — HISTORY OF PRESENT ILLNESS
[FreeTextEntry1] : 67 y/o postmenopausal woman  experiencing discmfort x 1 month postcoital discomfort, does not take pain meds  no vaginal bleeding

## 2023-12-19 NOTE — DISCUSSION/SUMMARY
[FreeTextEntry1] : 67 y/o postmenopausal woman  Pelvic pain pelvic us  UA, C/S, GC/Chl Discussed bladder emptying befor and after cotius, consider prophylactic antibiotics pstcotial, pharmacology alert ntoed with macrobid. Addendum Rx Keflex  500mg po x1 postcoital F/u 3 months

## 2023-12-26 ENCOUNTER — OUTPATIENT (OUTPATIENT)
Dept: OUTPATIENT SERVICES | Facility: HOSPITAL | Age: 68
LOS: 1 days | End: 2023-12-26
Payer: MEDICARE

## 2023-12-26 ENCOUNTER — APPOINTMENT (OUTPATIENT)
Dept: ULTRASOUND IMAGING | Facility: CLINIC | Age: 68
End: 2023-12-26
Payer: MEDICARE

## 2023-12-26 DIAGNOSIS — R10.2 PELVIC AND PERINEAL PAIN: ICD-10-CM

## 2023-12-26 PROCEDURE — 76856 US EXAM PELVIC COMPLETE: CPT

## 2023-12-26 PROCEDURE — 76856 US EXAM PELVIC COMPLETE: CPT | Mod: 26

## 2023-12-26 PROCEDURE — 76830 TRANSVAGINAL US NON-OB: CPT

## 2023-12-26 PROCEDURE — 76830 TRANSVAGINAL US NON-OB: CPT | Mod: 26

## 2023-12-27 ENCOUNTER — TRANSCRIPTION ENCOUNTER (OUTPATIENT)
Age: 68
End: 2023-12-27

## 2024-03-06 ENCOUNTER — TRANSCRIPTION ENCOUNTER (OUTPATIENT)
Age: 69
End: 2024-03-06

## 2024-03-09 ENCOUNTER — TRANSCRIPTION ENCOUNTER (OUTPATIENT)
Age: 69
End: 2024-03-09

## 2024-09-03 ENCOUNTER — TRANSCRIPTION ENCOUNTER (OUTPATIENT)
Age: 69
End: 2024-09-03

## 2024-10-25 ENCOUNTER — APPOINTMENT (OUTPATIENT)
Dept: RADIOLOGY | Facility: CLINIC | Age: 69
End: 2024-10-25
Payer: MEDICARE

## 2024-10-25 ENCOUNTER — OUTPATIENT (OUTPATIENT)
Dept: OUTPATIENT SERVICES | Facility: HOSPITAL | Age: 69
LOS: 1 days | End: 2024-10-25
Payer: MEDICARE

## 2024-10-25 DIAGNOSIS — Z00.00 ENCOUNTER FOR GENERAL ADULT MEDICAL EXAMINATION WITHOUT ABNORMAL FINDINGS: ICD-10-CM

## 2024-10-25 PROCEDURE — 73562 X-RAY EXAM OF KNEE 3: CPT

## 2024-10-25 PROCEDURE — 73562 X-RAY EXAM OF KNEE 3: CPT | Mod: 26,50

## 2024-12-20 ENCOUNTER — APPOINTMENT (OUTPATIENT)
Dept: OBGYN | Facility: CLINIC | Age: 69
End: 2024-12-20
Payer: MEDICARE

## 2024-12-20 DIAGNOSIS — R92.8 OTHER ABNORMAL AND INCONCLUSIVE FINDINGS ON DIAGNOSTIC IMAGING OF BREAST: ICD-10-CM

## 2024-12-20 DIAGNOSIS — R10.2 PELVIC AND PERINEAL PAIN: ICD-10-CM

## 2024-12-20 PROCEDURE — 99214 OFFICE O/P EST MOD 30 MIN: CPT

## 2024-12-20 RX ORDER — NITROFURANTOIN (MONOHYDRATE/MACROCRYSTALS) 25; 75 MG/1; MG/1
100 CAPSULE ORAL DAILY
Qty: 30 | Refills: 1 | Status: ACTIVE | COMMUNITY
Start: 2024-12-20 | End: 1900-01-01

## 2024-12-21 LAB — BACTERIA UR CULT: NORMAL

## 2024-12-23 LAB
BV BACTERIA RRNA VAG QL NAA+PROBE: NOT DETECTED
C GLABRATA RNA VAG QL NAA+PROBE: NOT DETECTED
C TRACH RRNA SPEC QL NAA+PROBE: NOT DETECTED
CANDIDA RRNA VAG QL PROBE: NOT DETECTED
N GONORRHOEA RRNA SPEC QL NAA+PROBE: NOT DETECTED
T VAGINALIS RRNA SPEC QL NAA+PROBE: NOT DETECTED

## 2025-04-18 ENCOUNTER — APPOINTMENT (OUTPATIENT)
Dept: MRI IMAGING | Facility: CLINIC | Age: 70
End: 2025-04-18

## 2025-04-18 ENCOUNTER — OUTPATIENT (OUTPATIENT)
Dept: OUTPATIENT SERVICES | Facility: HOSPITAL | Age: 70
LOS: 1 days | End: 2025-04-18
Payer: MEDICARE

## 2025-04-18 DIAGNOSIS — Z00.8 ENCOUNTER FOR OTHER GENERAL EXAMINATION: ICD-10-CM

## 2025-04-18 PROCEDURE — 72141 MRI NECK SPINE W/O DYE: CPT

## 2025-04-18 PROCEDURE — 72141 MRI NECK SPINE W/O DYE: CPT | Mod: 26

## 2025-04-29 ENCOUNTER — TRANSCRIPTION ENCOUNTER (OUTPATIENT)
Age: 70
End: 2025-04-29

## 2025-07-25 ENCOUNTER — NON-APPOINTMENT (OUTPATIENT)
Age: 70
End: 2025-07-25

## 2025-08-04 ENCOUNTER — NON-APPOINTMENT (OUTPATIENT)
Age: 70
End: 2025-08-04

## 2025-08-05 ENCOUNTER — APPOINTMENT (OUTPATIENT)
Dept: OTOLARYNGOLOGY | Facility: CLINIC | Age: 70
End: 2025-08-05
Payer: MEDICARE

## 2025-08-05 VITALS — WEIGHT: 150 LBS | HEIGHT: 65 IN | BODY MASS INDEX: 24.99 KG/M2

## 2025-08-05 DIAGNOSIS — H92.02 OTALGIA, LEFT EAR: ICD-10-CM

## 2025-08-05 DIAGNOSIS — M26.623 ARTHRALGIA OF BILATERAL TEMPOROMANDIBULAR JOINT: ICD-10-CM

## 2025-08-05 DIAGNOSIS — M54.2 CERVICALGIA: ICD-10-CM

## 2025-08-05 PROCEDURE — 92557 COMPREHENSIVE HEARING TEST: CPT

## 2025-08-05 PROCEDURE — 99203 OFFICE O/P NEW LOW 30 MIN: CPT

## 2025-08-05 PROCEDURE — 92504 EAR MICROSCOPY EXAMINATION: CPT

## 2025-08-05 PROCEDURE — 92567 TYMPANOMETRY: CPT

## 2025-09-04 ENCOUNTER — APPOINTMENT (OUTPATIENT)
Dept: OBGYN | Facility: CLINIC | Age: 70
End: 2025-09-04
Payer: MEDICARE

## 2025-09-04 VITALS
WEIGHT: 178.5 LBS | HEIGHT: 65 IN | SYSTOLIC BLOOD PRESSURE: 135 MMHG | DIASTOLIC BLOOD PRESSURE: 76 MMHG | BODY MASS INDEX: 29.74 KG/M2

## 2025-09-04 VITALS — DIASTOLIC BLOOD PRESSURE: 74 MMHG | SYSTOLIC BLOOD PRESSURE: 118 MMHG

## 2025-09-04 DIAGNOSIS — Z01.419 ENCOUNTER FOR GYNECOLOGICAL EXAMINATION (GENERAL) (ROUTINE) W/OUT ABNORMAL FINDINGS: ICD-10-CM

## 2025-09-04 DIAGNOSIS — M81.0 AGE-RELATED OSTEOPOROSIS W/OUT CURRENT PATHOLOGICAL FRACTURE: ICD-10-CM

## 2025-09-04 PROCEDURE — 99397 PER PM REEVAL EST PAT 65+ YR: CPT

## 2025-09-09 LAB — CYTOLOGY CVX/VAG DOC THIN PREP: ABNORMAL
